# Patient Record
Sex: MALE | Employment: OTHER
[De-identification: names, ages, dates, MRNs, and addresses within clinical notes are randomized per-mention and may not be internally consistent; named-entity substitution may affect disease eponyms.]

---

## 2017-03-01 RX ORDER — TRAMADOL HYDROCHLORIDE 50 MG/1
50 TABLET ORAL EVERY 6 HOURS PRN
Qty: 90 TABLET | Refills: 1 | OUTPATIENT
Start: 2017-03-01 | End: 2017-03-11

## 2017-03-03 ENCOUNTER — TELEPHONE (OUTPATIENT)
Dept: FAMILY MEDICINE CLINIC | Facility: CLINIC | Age: 62
End: 2017-03-03

## 2017-03-03 ENCOUNTER — OFFICE VISIT (OUTPATIENT)
Dept: FAMILY MEDICINE CLINIC | Facility: CLINIC | Age: 62
End: 2017-03-03

## 2017-03-03 VITALS
BODY MASS INDEX: 30.3 KG/M2 | HEIGHT: 71 IN | SYSTOLIC BLOOD PRESSURE: 126 MMHG | HEART RATE: 86 BPM | TEMPERATURE: 98 F | DIASTOLIC BLOOD PRESSURE: 82 MMHG | OXYGEN SATURATION: 98 % | WEIGHT: 216.4 LBS

## 2017-03-03 DIAGNOSIS — Z12.5 SCREENING FOR PROSTATE CANCER: ICD-10-CM

## 2017-03-03 DIAGNOSIS — M79.646 THUMB PAIN, UNSPECIFIED LATERALITY: ICD-10-CM

## 2017-03-03 DIAGNOSIS — F32.A DEPRESSION, UNSPECIFIED DEPRESSION TYPE: ICD-10-CM

## 2017-03-03 DIAGNOSIS — Z12.11 SCREENING FOR COLON CANCER: ICD-10-CM

## 2017-03-03 DIAGNOSIS — Z13.9 SCREENING: ICD-10-CM

## 2017-03-03 DIAGNOSIS — G89.29 OTHER CHRONIC PAIN: ICD-10-CM

## 2017-03-03 DIAGNOSIS — M19.90 ARTHRITIS: Primary | ICD-10-CM

## 2017-03-03 PROCEDURE — G0328 FECAL BLOOD SCRN IMMUNOASSAY: HCPCS | Performed by: PHYSICIAN ASSISTANT

## 2017-03-03 PROCEDURE — 99214 OFFICE O/P EST MOD 30 MIN: CPT | Performed by: PHYSICIAN ASSISTANT

## 2017-03-03 PROCEDURE — 80305 DRUG TEST PRSMV DIR OPT OBS: CPT | Performed by: PHYSICIAN ASSISTANT

## 2017-03-03 RX ORDER — TRAMADOL HYDROCHLORIDE 50 MG/1
50 TABLET ORAL EVERY 8 HOURS PRN
Qty: 90 TABLET | Refills: 0 | Status: SHIPPED | OUTPATIENT
Start: 2017-03-03 | End: 2017-06-01

## 2017-03-03 RX ORDER — TRAMADOL HYDROCHLORIDE 50 MG/1
50 TABLET ORAL DAILY
COMMUNITY
End: 2017-03-03 | Stop reason: SDUPTHER

## 2017-03-03 ASSESSMENT — ENCOUNTER SYMPTOMS
SHORTNESS OF BREATH: 0
SINUS PRESSURE: 0
RHINORRHEA: 0
COUGH: 0
NAUSEA: 0
EYE DISCHARGE: 0
CHEST TIGHTNESS: 0
ABDOMINAL PAIN: 0
VOMITING: 0
DIARRHEA: 0
SORE THROAT: 0
EYE ITCHING: 0

## 2017-04-18 LAB
ALBUMIN SERPL-MCNC: 4 G/DL
ALP BLD-CCNC: 62 U/L
ALT SERPL-CCNC: 11 U/L
AST SERPL-CCNC: 14 U/L
BASOPHILS ABSOLUTE: 0.1 /ΜL
BASOPHILS RELATIVE PERCENT: 0.9 %
BILIRUB SERPL-MCNC: 0.7 MG/DL (ref 0.1–1.4)
BUN BLDV-MCNC: 19 MG/DL
C-REACTIVE PROTEIN: 0.3
CALCIUM SERPL-MCNC: 8.8 MG/DL
CHLORIDE BLD-SCNC: 105 MMOL/L
CHOLESTEROL, TOTAL: 188 MG/DL
CHOLESTEROL/HDL RATIO: 3.8
CO2: 27 MMOL/L
CREAT SERPL-MCNC: 0.92 MG/DL
EOSINOPHILS ABSOLUTE: 0.4 /ΜL
EOSINOPHILS RELATIVE PERCENT: 6.6 %
GFR CALCULATED: 60
GLUCOSE BLD-MCNC: 86 MG/DL
HCT VFR BLD CALC: 44.2 % (ref 41–53)
HDLC SERPL-MCNC: 50 MG/DL (ref 35–70)
HEMOGLOBIN: 15.1 G/DL (ref 13.5–17.5)
LDL CHOLESTEROL CALCULATED: 122 MG/DL (ref 0–160)
LYMPHOCYTES ABSOLUTE: 1.6 /ΜL
LYMPHOCYTES RELATIVE PERCENT: 24.3 %
MCH RBC QN AUTO: 30 PG
MCHC RBC AUTO-ENTMCNC: 34.1 G/DL
MCV RBC AUTO: 88 FL
MONOCYTES ABSOLUTE: 0.5 /ΜL
MONOCYTES RELATIVE PERCENT: 8.2 %
NEUTROPHILS ABSOLUTE: 3.9 /ΜL
NEUTROPHILS RELATIVE PERCENT: 60 %
PDW BLD-RTO: 13.7 %
PLATELET # BLD: 239 K/ΜL
PMV BLD AUTO: 7.6 FL
POTASSIUM SERPL-SCNC: 3.6 MMOL/L
PROSTATE SPECIFIC ANTIGEN: 0.69 NG/ML
RBC # BLD: 5.02 10^6/ΜL
RHEUMATOID FACTOR: 10
SEDIMENTATION RATE, ERYTHROCYTE: 11
SODIUM BLD-SCNC: 140 MMOL/L
TOTAL PROTEIN: 7.1
TRIGL SERPL-MCNC: 80 MG/DL
TSH SERPL DL<=0.05 MIU/L-ACNC: 1.18 UIU/ML
VITAMIN D 25-HYDROXY: 32.4
VITAMIN D2, 25 HYDROXY: NORMAL
VITAMIN D3,25 HYDROXY: NORMAL
VLDLC SERPL CALC-MCNC: 16 MG/DL
WBC # BLD: 6.4 10^3/ML

## 2017-04-19 DIAGNOSIS — Z12.5 SCREENING FOR PROSTATE CANCER: ICD-10-CM

## 2017-04-19 DIAGNOSIS — Z13.9 SCREENING: ICD-10-CM

## 2017-04-19 DIAGNOSIS — G89.29 OTHER CHRONIC PAIN: ICD-10-CM

## 2017-04-19 DIAGNOSIS — M19.90 ARTHRITIS: ICD-10-CM

## 2017-04-19 DIAGNOSIS — F32.A DEPRESSION, UNSPECIFIED DEPRESSION TYPE: ICD-10-CM

## 2017-06-14 ENCOUNTER — OFFICE VISIT (OUTPATIENT)
Dept: FAMILY MEDICINE CLINIC | Age: 62
End: 2017-06-14
Payer: COMMERCIAL

## 2017-06-14 VITALS
OXYGEN SATURATION: 97 % | HEART RATE: 76 BPM | HEIGHT: 71 IN | SYSTOLIC BLOOD PRESSURE: 126 MMHG | WEIGHT: 211.3 LBS | BODY MASS INDEX: 29.58 KG/M2 | DIASTOLIC BLOOD PRESSURE: 82 MMHG | TEMPERATURE: 98 F

## 2017-06-14 DIAGNOSIS — Z12.11 SCREENING FOR COLON CANCER: ICD-10-CM

## 2017-06-14 DIAGNOSIS — M79.641 PAIN IN BOTH HANDS: Primary | ICD-10-CM

## 2017-06-14 DIAGNOSIS — M19.90 OSTEOARTHRITIS, UNSPECIFIED OSTEOARTHRITIS TYPE, UNSPECIFIED SITE: ICD-10-CM

## 2017-06-14 DIAGNOSIS — M79.642 PAIN IN BOTH HANDS: Primary | ICD-10-CM

## 2017-06-14 DIAGNOSIS — F32.A DEPRESSION, UNSPECIFIED DEPRESSION TYPE: ICD-10-CM

## 2017-06-14 PROCEDURE — 99213 OFFICE O/P EST LOW 20 MIN: CPT | Performed by: PHYSICIAN ASSISTANT

## 2017-06-14 PROCEDURE — 73130 X-RAY EXAM OF HAND: CPT | Performed by: PHYSICIAN ASSISTANT

## 2017-06-14 ASSESSMENT — ENCOUNTER SYMPTOMS
ABDOMINAL PAIN: 0
VOMITING: 0
DIARRHEA: 0
SHORTNESS OF BREATH: 0
EYE DISCHARGE: 0
COUGH: 0
RHINORRHEA: 0
EYE ITCHING: 0
CHEST TIGHTNESS: 0
SINUS PRESSURE: 0
NAUSEA: 0
SORE THROAT: 0

## 2017-06-14 ASSESSMENT — PATIENT HEALTH QUESTIONNAIRE - PHQ9
2. FEELING DOWN, DEPRESSED OR HOPELESS: 0
1. LITTLE INTEREST OR PLEASURE IN DOING THINGS: 0
SUM OF ALL RESPONSES TO PHQ9 QUESTIONS 1 & 2: 0
SUM OF ALL RESPONSES TO PHQ QUESTIONS 1-9: 0

## 2017-06-20 ENCOUNTER — TELEPHONE (OUTPATIENT)
Dept: FAMILY MEDICINE CLINIC | Age: 62
End: 2017-06-20

## 2017-06-22 RX ORDER — TRAMADOL HYDROCHLORIDE 50 MG/1
50 TABLET ORAL EVERY 6 HOURS PRN
Qty: 90 TABLET | Refills: 0 | Status: SHIPPED | OUTPATIENT
Start: 2017-06-22 | End: 2017-10-09 | Stop reason: SDUPTHER

## 2017-06-22 RX ORDER — TRAMADOL HYDROCHLORIDE 50 MG/1
50 TABLET ORAL EVERY 6 HOURS PRN
Qty: 90 TABLET | Refills: 0 | OUTPATIENT
Start: 2017-06-22 | End: 2017-07-02

## 2017-07-25 RX ORDER — CELECOXIB 200 MG/1
200 CAPSULE ORAL 2 TIMES DAILY
Qty: 180 CAPSULE | Refills: 0 | Status: SHIPPED | OUTPATIENT
Start: 2017-07-25 | End: 2017-10-09

## 2017-09-14 NOTE — TELEPHONE ENCOUNTER
Pt called and stated he needs a refill for this med and that he only takes 1 pill before work in the morning and that's it. I did advise he may need an appt. Pt stated he will wait to see what Manny Redd has to say. Pt did say he fell on a slick spot at work and hit his bottom and he is having pain and I did LM to advise he needs to follow up with his employer to find out where he can be evaluated for the work injury since we don't do workers comp here.

## 2017-09-18 RX ORDER — TRAMADOL HYDROCHLORIDE 50 MG/1
50 TABLET ORAL EVERY 8 HOURS PRN
Qty: 90 TABLET | Refills: 0 | OUTPATIENT
Start: 2017-09-18 | End: 2017-09-28

## 2017-09-20 ENCOUNTER — TELEPHONE (OUTPATIENT)
Dept: FAMILY MEDICINE CLINIC | Age: 62
End: 2017-09-20

## 2017-10-09 ENCOUNTER — TELEPHONE (OUTPATIENT)
Dept: FAMILY MEDICINE CLINIC | Age: 62
End: 2017-10-09

## 2017-10-09 ENCOUNTER — OFFICE VISIT (OUTPATIENT)
Dept: FAMILY MEDICINE CLINIC | Age: 62
End: 2017-10-09
Payer: COMMERCIAL

## 2017-10-09 VITALS
HEART RATE: 68 BPM | DIASTOLIC BLOOD PRESSURE: 63 MMHG | BODY MASS INDEX: 29.96 KG/M2 | WEIGHT: 214 LBS | SYSTOLIC BLOOD PRESSURE: 101 MMHG | OXYGEN SATURATION: 96 % | HEIGHT: 71 IN | TEMPERATURE: 97.8 F

## 2017-10-09 DIAGNOSIS — M54.42 LEFT-SIDED LOW BACK PAIN WITH LEFT-SIDED SCIATICA, UNSPECIFIED CHRONICITY: Primary | ICD-10-CM

## 2017-10-09 DIAGNOSIS — M19.90 OSTEOARTHRITIS, UNSPECIFIED OSTEOARTHRITIS TYPE, UNSPECIFIED SITE: Primary | ICD-10-CM
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
## 2017-10-09 DIAGNOSIS — G89.29 CHRONIC LOW BACK PAIN WITH LEFT-SIDED SCIATICA, UNSPECIFIED BACK PAIN LATERALITY: ICD-10-CM

## 2017-10-09 DIAGNOSIS — M54.42 CHRONIC LOW BACK PAIN WITH LEFT-SIDED SCIATICA, UNSPECIFIED BACK PAIN LATERALITY: ICD-10-CM

## 2017-10-09 DIAGNOSIS — M54.2 NECK PAIN: ICD-10-CM

## 2017-10-09 DIAGNOSIS — R19.5 POSITIVE FIT (FECAL IMMUNOCHEMICAL TEST): ICD-10-CM

## 2017-10-09 PROCEDURE — 72110 X-RAY EXAM L-2 SPINE 4/>VWS: CPT | Performed by: PHYSICIAN ASSISTANT

## 2017-10-09 PROCEDURE — 99213 OFFICE O/P EST LOW 20 MIN: CPT | Performed by: PHYSICIAN ASSISTANT

## 2017-10-09 RX ORDER — TRAMADOL HYDROCHLORIDE 50 MG/1
50 TABLET ORAL DAILY PRN
Qty: 90 TABLET | Refills: 0 | Status: SHIPPED | OUTPATIENT
Start: 2017-10-09 | End: 2017-12-19

## 2017-10-09 ASSESSMENT — ENCOUNTER SYMPTOMS
BACK PAIN: 1
BOWEL INCONTINENCE: 0
CHEST TIGHTNESS: 0
COUGH: 0
RHINORRHEA: 0
SORE THROAT: 0
VOMITING: 0
SINUS PRESSURE: 0
ABDOMINAL PAIN: 0
SHORTNESS OF BREATH: 0
NAUSEA: 0
EYE ITCHING: 0
EYE DISCHARGE: 0
DIARRHEA: 0

## 2017-10-09 NOTE — PROGRESS NOTES
pain is at a severity of 5/10. The pain is mild. The pain has been fluctuating since the incident. Pertinent negatives include no chest pain or numbness. He has tried nothing for the symptoms. The treatment provided no relief. Fall   The accident occurred more than 1 week ago. The pain is at a severity of 5/10. The pain is moderate. Pertinent negatives include no abdominal pain, bowel incontinence, fever, headaches, hearing loss, hematuria, loss of consciousness, nausea, numbness or vomiting. The treatment provided moderate relief. Back Pain   Pertinent negatives include no abdominal pain, bowel incontinence, chest pain, dysuria, fever, headaches, numbness or weakness. No results found for: LABA1C          ( goal A1C is < 7)   No results found for: LABMICR  LDL Calculated (mg/dL)   Date Value   04/18/2017 122   06/07/2016 156       (goal LDL is <100)   AST (U/L)   Date Value   04/18/2017 14     ALT (U/L)   Date Value   04/18/2017 11     BUN (mg/dL)   Date Value   04/18/2017 19     BP Readings from Last 3 Encounters:   10/09/17 101/63   06/14/17 126/82   03/03/17 126/82          (goal 120/80)    Past Medical History:   Diagnosis Date    Depression     Knee pain     Night sweats     Osteoarthritis     Chronic    Weight loss       History reviewed. No pertinent surgical history. History reviewed. No pertinent family history. Social History   Substance Use Topics    Smoking status: Never Smoker    Smokeless tobacco: Never Used    Alcohol use Not on file      Current Outpatient Prescriptions   Medication Sig Dispense Refill    traMADol (ULTRAM) 50 MG tablet Take 1 tablet by mouth daily as needed for Pain 90 tablet 0    celecoxib (CELEBREX) 200 MG capsule Take 1 capsule by mouth 2 times daily 180 capsule 0    chlorhexidine (PERIDEX) 0.12 % solution        No current facility-administered medications for this visit.       Allergies   Allergen Reactions    Hydrocodone Nausea Only       Health reviewed. Arthritis - Bilat hands, feet, neck, back. Taking Celebrex BID-D/C med for pos FIT test - 10/9/17. Ultram 1 daily. HLD - H/o labs ordered. Balanced diet and routine exercise encouraged. Multivitamin with vitamin D daily encouraged. Good sleep hygiene encouraged. Dental exam q 6 mo. Vision yearly. Sunscreen encouraged. Immunizations reviewed. Humaira Tuttle. Urine drug - 3/3/17    Pain contract - 3/3/17    Refusing Flu, shingles and Tdap     Pneumonia at 65    Tdap - 6/14/17    PSA - 4/2017 NL    Colonoscopy - Pt refusing. FIT kit pos 10/9/17 - Referral for colonoscopy / endoscopy. Stop all NSAIDs     Patient given educational materials - see patient instructions. Discussed use, benefit, and side effects of prescribed medications. All patient questions answered. Pt voiced understanding. Reviewed health maintenance. Instructed to continue current medications, diet and exercise. Patient agreed with treatment plan. Follow up as directed.      Electronically signed by Godwin Arteaga PA-C on 10/9/2017 at 10:04 AM
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
## 2017-10-11 DIAGNOSIS — M54.2 NECK PAIN: ICD-10-CM

## 2017-10-11 DIAGNOSIS — R93.89 ABNORMAL X-RAY: Primary | ICD-10-CM

## 2017-10-11 DIAGNOSIS — G89.29 CHRONIC LOW BACK PAIN WITH LEFT-SIDED SCIATICA, UNSPECIFIED BACK PAIN LATERALITY: ICD-10-CM

## 2017-10-11 DIAGNOSIS — M54.42 CHRONIC LOW BACK PAIN WITH LEFT-SIDED SCIATICA, UNSPECIFIED BACK PAIN LATERALITY: ICD-10-CM

## 2017-10-31 ENCOUNTER — HOSPITAL ENCOUNTER (OUTPATIENT)
Dept: MRI IMAGING | Age: 62
Discharge: HOME | End: 2017-10-31
Payer: COMMERCIAL
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
## 2017-10-31 DIAGNOSIS — R93.89 ABNORMAL X-RAY: ICD-10-CM

## 2017-10-31 DIAGNOSIS — M54.2 NECK PAIN: ICD-10-CM

## 2017-10-31 PROCEDURE — 72148 MRI LUMBAR SPINE W/O DYE: CPT

## 2017-10-31 PROCEDURE — 72141 MRI NECK SPINE W/O DYE: CPT

## 2017-11-13 ENCOUNTER — TELEPHONE (OUTPATIENT)
Dept: FAMILY MEDICINE CLINIC | Age: 62
End: 2017-11-13

## 2018-01-09 ENCOUNTER — OFFICE VISIT (OUTPATIENT)
Dept: FAMILY MEDICINE CLINIC | Age: 63
End: 2018-01-09
Payer: COMMERCIAL

## 2018-01-09 VITALS
DIASTOLIC BLOOD PRESSURE: 69 MMHG | SYSTOLIC BLOOD PRESSURE: 123 MMHG | TEMPERATURE: 98.1 F | WEIGHT: 217 LBS | OXYGEN SATURATION: 97 % | HEIGHT: 71 IN | BODY MASS INDEX: 30.38 KG/M2 | HEART RATE: 76 BPM

## 2018-01-09 DIAGNOSIS — Z00.00 ANNUAL PHYSICAL EXAM: Primary | ICD-10-CM

## 2018-01-09 DIAGNOSIS — G47.00 INSOMNIA, UNSPECIFIED TYPE: ICD-10-CM

## 2018-01-09 DIAGNOSIS — Z11.4 ENCOUNTER FOR SCREENING FOR HIV: ICD-10-CM

## 2018-01-09 DIAGNOSIS — Z12.5 SCREENING PSA (PROSTATE SPECIFIC ANTIGEN): ICD-10-CM

## 2018-01-09 DIAGNOSIS — F32.A DEPRESSION, UNSPECIFIED DEPRESSION TYPE: ICD-10-CM

## 2018-01-09 DIAGNOSIS — M25.552 PAIN OF LEFT HIP JOINT: ICD-10-CM

## 2018-01-09 DIAGNOSIS — M19.90 OSTEOARTHRITIS, UNSPECIFIED OSTEOARTHRITIS TYPE, UNSPECIFIED SITE: ICD-10-CM

## 2018-01-09 DIAGNOSIS — Z11.59 NEED FOR HEPATITIS C SCREENING TEST: ICD-10-CM

## 2018-01-09 DIAGNOSIS — R41.3 MEMORY CHANGE: ICD-10-CM

## 2018-01-09 PROCEDURE — 99396 PREV VISIT EST AGE 40-64: CPT | Performed by: PHYSICIAN ASSISTANT

## 2018-01-09 RX ORDER — TRAZODONE HYDROCHLORIDE 50 MG/1
50 TABLET ORAL NIGHTLY
Qty: 30 TABLET | Refills: 0 | Status: SHIPPED | OUTPATIENT
Start: 2018-01-09 | End: 2019-11-08

## 2018-01-09 RX ORDER — CELECOXIB 200 MG/1
200 CAPSULE ORAL 2 TIMES DAILY
COMMUNITY
End: 2018-01-09

## 2018-01-09 RX ORDER — TRAMADOL HYDROCHLORIDE 50 MG/1
50 TABLET ORAL EVERY 6 HOURS PRN
COMMUNITY
End: 2018-01-30 | Stop reason: SDUPTHER

## 2018-01-09 ASSESSMENT — ENCOUNTER SYMPTOMS
NAUSEA: 0
CHEST TIGHTNESS: 0
RHINORRHEA: 0
EYE ITCHING: 0
DIARRHEA: 0
SHORTNESS OF BREATH: 0
EYE DISCHARGE: 0
SORE THROAT: 0
VOMITING: 0
SINUS PRESSURE: 0
COUGH: 0
BACK PAIN: 1
ABDOMINAL PAIN: 0

## 2018-01-09 NOTE — PROGRESS NOTES
[address]  [address] [place] [id number]  Dept: [phone]  Dept Fax: [phone]    Davey Pruitt is a 58 y.o. male who presents today for his medical conditions/complaints as noted below. Davey Pruitt is c/o of   Chief Complaint   Patient presents with    Depression    Arthritis     Visit Information    Have you changed or started any medications since your last visit including any over-the-counter medicines, vitamins, or herbal medicines? no   Have you stopped taking any of your medications? Is so, why? -  no  Are you having any side effects from any of your medications? - no    Have you seen any other physician or provider since your last visit?  no   Have you had any other diagnostic tests since your last visit?  no   Have you been seen in the emergency room and/or had an admission in a hospital since we last saw you?  no   Have you had your routine dental cleaning in the past 6 months?  yes -      Do you have an active [employer] account? If no, what is the barrier? No:     Patient Care Team:  Julieta Hendrickson PA-C as PCP - General (Physician Assistant)    Medical History Review  Past Medical, Family, and Social History reviewed and does contribute to the patient presenting condition    Health Maintenance   Topic Date Due    Hepatitis C screen  1955    HIV screen  03/24/1970    Flu vaccine (1) 10/09/2018 (Originally 9/1/2017)    Lipid screen  04/18/2022    Colon cancer screen colonoscopy  05/17/2026    DTaP/Tdap/Td vaccine (2 - Td) 06/14/2027    Zostavax vaccine  Addressed               HPI:     Hand Pain    The incident occurred more than 1 week ago. There was no injury mechanism. The pain is present in the left hand and right hand. The quality of the pain is described as aching. The pain does not radiate. The pain is at a severity of 5/10. The pain is mild. The pain has been fluctuating since the incident.  Pertinent negatives include no chest pain, numbness or tingling. He has tried nothing for the symptoms. The treatment provided no relief. Back Pain   This is a chronic problem. The pain is present in the lumbar spine (Neck). The pain is at a severity of 6/10. Pertinent negatives include no abdominal pain, chest pain, dysuria, fever, headaches, leg pain, numbness, tingling or weakness. Mental Health Problem     Additional symptoms of the illness include anhedonia and insomnia. Additional symptoms of the illness do not include hypersomnia, appetite change, unexpected weight change, fatigue, agitation, psychomotor retardation, headaches or abdominal pain. He does not admit to suicidal ideas. He does not have a plan to commit suicide. He does not contemplate harming himself. He has not already injured self. He does not contemplate injuring another person. He has not already  injured another person. No results found for: LABA1C          ( goal A1C is < 7)   No results found for: LABMICR  LDL Calculated (mg/dL)   Date Value   04/18/2017 122   06/07/2016 156       (goal LDL is <100)   AST (U/L)   Date Value   04/18/2017 14     ALT (U/L)   Date Value   04/18/2017 11     BUN (mg/dL)   Date Value   04/18/2017 19     BP Readings from Last 3 Encounters:   01/09/18 123/69   10/09/17 101/63   06/14/17 126/82          (goal 120/80)    Past Medical History:   Diagnosis Date    Depression     Knee pain     Night sweats     Osteoarthritis     Chronic    Weight loss       History reviewed. No pertinent surgical history. History reviewed. No pertinent family history. Social History   Substance Use Topics    Smoking status: Never Smoker    Smokeless tobacco: Never Used    Alcohol use Not on file      Current Outpatient Prescriptions   Medication Sig Dispense Refill    traMADol (ULTRAM) 50 MG tablet Take 50 mg by mouth every 6 hours as needed for Pain.       traZODone (DESYREL) 50 MG tablet Take 1 tablet by mouth nightly routine exercise encouraged. Multivitamin with vitamin D daily encouraged. Good sleep hygiene encouraged. Dental exam q 6 mo. Vision yearly. Sunscreen encouraged. Immunizations reviewed. Janne Sprain Dr. Nadara Dancer. Urine drug - 3/3/17    Pain contract - 3/3/17    Refusing Flu, shingles and Tdap     Pneumonia at 65    Tdap - 6/14/17    PSA - 4/2017 NL    Colonoscopy - Pt refusing. FIT kit pos 10/9/17 - Referral for colonoscopy / endoscopy. Stop all NSAIDs     Patient given educational materials - see patient instructions. Discussed use, benefit, and side effects of prescribed medications. All patient questions answered. Pt voiced understanding. Reviewed health maintenance. Instructed to continue current medications, diet and exercise. Patient agreed with treatment plan. Follow up as directed.      Electronically signed by Bobby Fisher PA-C on 1/9/2018 at 10:24 AM

## 2018-01-24 ENCOUNTER — HOSPITAL ENCOUNTER (OUTPATIENT)
Dept: MRI IMAGING | Age: 63
Discharge: HOME | End: 2018-01-24
Payer: COMMERCIAL

## 2018-01-24 DIAGNOSIS — R41.3 MEMORY CHANGE: ICD-10-CM

## 2018-01-24 LAB
CREAT SERPL-MCNC: 0.99 MG/DL (ref 0.7–1.2)
GFR AFRICAN AMERICAN: >60 ML/MIN
GFR NON-AFRICAN AMERICAN: >60 ML/MIN
GFR SERPL CREATININE-BSD FRML MDRD: NORMAL ML/MIN/{1.73_M2}
GFR SERPL CREATININE-BSD FRML MDRD: NORMAL ML/MIN/{1.73_M2}

## 2018-01-24 PROCEDURE — 82565 ASSAY OF CREATININE: CPT

## 2018-01-24 PROCEDURE — A9579 GAD-BASE MR CONTRAST NOS,1ML: HCPCS | Performed by: PHYSICIAN ASSISTANT

## 2018-01-24 PROCEDURE — 70553 MRI BRAIN STEM W/O & W/DYE: CPT

## 2018-01-24 PROCEDURE — 6360000004 HC RX CONTRAST MEDICATION: Performed by: PHYSICIAN ASSISTANT

## 2018-01-24 PROCEDURE — 36415 COLL VENOUS BLD VENIPUNCTURE: CPT

## 2018-01-24 RX ADMIN — GADOTERIDOL 20 ML: 279.3 INJECTION, SOLUTION INTRAVENOUS at 09:14

## 2018-01-30 DIAGNOSIS — G89.29 OTHER CHRONIC PAIN: Primary | ICD-10-CM

## 2018-01-30 RX ORDER — TRAMADOL HYDROCHLORIDE 50 MG/1
50 TABLET ORAL DAILY PRN
Qty: 90 TABLET | Refills: 0 | Status: SHIPPED | OUTPATIENT
Start: 2018-01-30 | End: 2018-04-30

## 2018-01-30 RX ORDER — AMOXICILLIN AND CLAVULANATE POTASSIUM 875; 125 MG/1; MG/1
1 TABLET, FILM COATED ORAL 2 TIMES DAILY
Qty: 20 TABLET | Refills: 0 | Status: SHIPPED | OUTPATIENT
Start: 2018-01-30 | End: 2018-02-09

## 2018-04-11 PROBLEM — Z12.5 SCREENING PSA (PROSTATE SPECIFIC ANTIGEN): Status: RESOLVED | Noted: 2018-01-09 | Resolved: 2018-04-11

## 2018-04-12 DIAGNOSIS — M25.552 PAIN OF LEFT HIP JOINT: ICD-10-CM

## 2018-12-31 DIAGNOSIS — F32.A DEPRESSION, UNSPECIFIED DEPRESSION TYPE: Primary | ICD-10-CM

## 2019-01-05 RX ORDER — CELECOXIB 200 MG/1
200 CAPSULE ORAL 2 TIMES DAILY
Qty: 120 CAPSULE | Refills: 1 | Status: SHIPPED | OUTPATIENT
Start: 2019-01-05 | End: 2020-01-02 | Stop reason: SDUPTHER

## 2019-06-17 ENCOUNTER — OFFICE VISIT (OUTPATIENT)
Dept: PODIATRY | Age: 64
End: 2019-06-17
Payer: COMMERCIAL

## 2019-06-17 VITALS — BODY MASS INDEX: 32.07 KG/M2 | HEIGHT: 70 IN | RESPIRATION RATE: 16 BRPM | WEIGHT: 224 LBS

## 2019-06-17 DIAGNOSIS — M79.605 PAIN OF LEFT LOWER EXTREMITY: ICD-10-CM

## 2019-06-17 DIAGNOSIS — M21.612 BUNION, LEFT FOOT: Primary | ICD-10-CM

## 2019-06-17 PROCEDURE — 99203 OFFICE O/P NEW LOW 30 MIN: CPT | Performed by: PODIATRIST

## 2019-06-17 NOTE — PROGRESS NOTES
[facility]  [facility] [address]  [address] [place] [address]  Dept: [phone]  Dept Fax: [phone]    NEW PATIENT PROGRESS NOTE  Date of patient's visit: 6/17/2019  Patient's Name:  Jaky Kent YOB: 1955            Patient Care Team:  Tena Cisneros PA-C as PCP - General (Physician Assistant)  Tena Cisneros PA-C as PCP - [facility] Empaneled Provider  Shankar Trevino DPM as Physician (Podiatry)        Chief Complaint   Patient presents with    New Patient    Foot Pain     left foot         HPI:   Jaky Kent is a 59 y.o. male who presents to the office today complaining of pain to left foot. Symptoms began  year(s) ago and have just increasingly became painful and changing walking. Patient relates pain is Present. Pain is rated 2 out of 10 and is described as intermittent. Treatments prior to today's visit include: no previous treatment for this problem. Currently denies F/C/N/V. Pt's primary care physician is Dale Nickerson PA-C last seen 1/9/18    Allergies   Allergen Reactions    Hydrocodone Nausea Only       Past Medical History:   Diagnosis Date    Depression     Knee pain     Night sweats     Osteoarthritis     Chronic    Weight loss        Prior to Admission medications    Medication Sig Start Date End Date Taking? Authorizing Provider   celecoxib (CELEBREX) 200 MG capsule Take 1 capsule by mouth 2 times daily 1/5/19   Te Garcia MD   traZODone (DESYREL) 50 MG tablet Take 1 tablet by mouth nightly 1/9/18   Tena Cisneros PA-C       No past surgical history on file. No family history on file.     Social History     Tobacco Use    Smoking status: Never Smoker    Smokeless tobacco: Never Used   Substance Use Topics    Alcohol use: Not on file       Review of Systems    Review of Systems:   History obtained from chart review and the patient  General ROS: negative for - chills, fatigue, fever, night sweats or weight gain  Constitutional: Negative for chills, diaphoresis, fatigue, fever and unexpected weight change. Musculoskeletal: Positive for arthralgias, gait problem and joint swelling. Neurological ROS: negative for - behavioral changes, confusion, headaches or seizures. Negative for weakness and numbness. Dermatological ROS: negative for - mole changes, rash  Cardiovascular: Negative for leg swelling. Gastrointestinal: Negative for constipation, diarrhea, nausea and vomiting. Lower Extremity Physical Examination:   Vitals:   Vitals:    06/17/19 0842   Resp: 16     General: AAO x 3 in NAD. Dermatologic Exam:  Skin lesion/ulceration Absent . Skin No rashes or nodules noted. .       Musculoskeletal:     1st MPJ ROM decreased, Bilateral.  Muscle strength 5/5, Bilateral.  Pain present upon palpation of left medial 1st MPJ. There is increased hallux lateral deviation and increased medial jasen prominence 1st MPJ. Medial longitudinal arch, Bilateral WNL. Ankle ROM WNL,Bilateral.    Dorsally contracted digits absent digits 1-5 Bilateral.     Vascular: DP and PT pulses palpable 2/4, Bilateral.  CFT <3 seconds, Bilateral.  Hair growth present to the level of the digits, Bilateral.  Edema absent, Bilateral.  Varicosities absent, Bilateral. Erythema absent, Bilateral    Neurological: Sensation intact to light touch to level of digits, Bilateral.  Protective sensation intact 10/10 sites via 5.07/10g [place] Monofilament, Bilateral.  negative Tinel's, Bilateral.  negative Valleix sign, Bilateral.      Integument: Warm, dry, supple, Bilateral.  Open lesion absent, Bilateral.  Interdigital maceration absent to web spaces 1-4, Bilateral.  Nails are normal in length, thickness and color 1-5 bilateral.  Fissures absent, Bilateral.     Radiographs:  3 views left foot: There is increased hallux lateral deviation and increased medial jasen prominence 1st MPJ.  Wide 1st and 2nd metatarsal IM elva.    Asessment: Patient is a 59 y.o. male with:    Diagnosis Orders   1. Bunion, left foot  XR FOOT LEFT (MIN 3 VIEWS)   2. Pain of left lower extremity  XR FOOT LEFT (MIN 3 VIEWS)         Plan: Patient examined and evaluated. Current condition and treatment options discussed in detail. Discussed conservative and surgical options with the patient. Reviewed and discussed xrays with patient with above findings. Advised pt to only consider surgery as last treatment option. He would like to discuss with family. Verbal and written instructions given to patient. Contact office with any questions/problems/concerns. RTC in 2month(s).     6/17/2019    Electronically signed by Kayla Mosqueda DPM on 6/17/2019 at 8:58 AM  6/17/2019

## 2019-11-08 ENCOUNTER — OFFICE VISIT (OUTPATIENT)
Dept: FAMILY MEDICINE CLINIC | Age: 64
End: 2019-11-08
Payer: COMMERCIAL

## 2019-11-08 ENCOUNTER — HOSPITAL ENCOUNTER (OUTPATIENT)
Age: 64
Setting detail: SPECIMEN
Discharge: HOME | End: 2019-11-08
Payer: COMMERCIAL

## 2019-11-08 VITALS
DIASTOLIC BLOOD PRESSURE: 68 MMHG | HEART RATE: 67 BPM | OXYGEN SATURATION: 97 % | SYSTOLIC BLOOD PRESSURE: 132 MMHG | WEIGHT: 224.4 LBS | HEIGHT: 71 IN | TEMPERATURE: 97.3 F | BODY MASS INDEX: 31.42 KG/M2

## 2019-11-08 DIAGNOSIS — Z12.5 SCREENING FOR PROSTATE CANCER: ICD-10-CM

## 2019-11-08 DIAGNOSIS — R05.9 COUGH: ICD-10-CM

## 2019-11-08 DIAGNOSIS — Z12.11 SCREENING FOR COLON CANCER: ICD-10-CM

## 2019-11-08 DIAGNOSIS — R06.83 SNORING: ICD-10-CM

## 2019-11-08 DIAGNOSIS — Z13.220 SCREENING FOR LIPID DISORDERS: ICD-10-CM

## 2019-11-08 DIAGNOSIS — R06.81 APNEA: ICD-10-CM

## 2019-11-08 DIAGNOSIS — M25.561 CHRONIC PAIN OF RIGHT KNEE: ICD-10-CM

## 2019-11-08 DIAGNOSIS — M54.2 CHRONIC NECK PAIN: ICD-10-CM

## 2019-11-08 DIAGNOSIS — G89.29 CHRONIC NECK PAIN: ICD-10-CM

## 2019-11-08 DIAGNOSIS — R39.11 URINARY HESITANCY: ICD-10-CM

## 2019-11-08 DIAGNOSIS — M54.50 CHRONIC LOW BACK PAIN, UNSPECIFIED BACK PAIN LATERALITY, UNSPECIFIED WHETHER SCIATICA PRESENT: ICD-10-CM

## 2019-11-08 DIAGNOSIS — R39.12 WEAK URINARY STREAM: ICD-10-CM

## 2019-11-08 DIAGNOSIS — N20.0 RENAL STONE: ICD-10-CM

## 2019-11-08 DIAGNOSIS — Z00.00 ANNUAL PHYSICAL EXAM: Primary | ICD-10-CM

## 2019-11-08 DIAGNOSIS — Z11.4 ENCOUNTER FOR SCREENING FOR HIV: ICD-10-CM

## 2019-11-08 DIAGNOSIS — Z13.29 SCREENING FOR THYROID DISORDER: ICD-10-CM

## 2019-11-08 DIAGNOSIS — G89.29 CHRONIC PAIN OF RIGHT KNEE: ICD-10-CM

## 2019-11-08 DIAGNOSIS — Z23 NEED FOR PROPHYLACTIC VACCINATION AND INOCULATION AGAINST VARICELLA: ICD-10-CM

## 2019-11-08 DIAGNOSIS — F32.A DEPRESSION, UNSPECIFIED DEPRESSION TYPE: ICD-10-CM

## 2019-11-08 DIAGNOSIS — R40.0 DAYTIME SLEEPINESS: ICD-10-CM

## 2019-11-08 DIAGNOSIS — M19.90 OSTEOARTHRITIS, UNSPECIFIED OSTEOARTHRITIS TYPE, UNSPECIFIED SITE: ICD-10-CM

## 2019-11-08 DIAGNOSIS — R19.5 POSITIVE FIT (FECAL IMMUNOCHEMICAL TEST): ICD-10-CM

## 2019-11-08 DIAGNOSIS — Z11.59 NEED FOR HEPATITIS C SCREENING TEST: ICD-10-CM

## 2019-11-08 DIAGNOSIS — Z13.1 SCREENING FOR DIABETES MELLITUS: ICD-10-CM

## 2019-11-08 DIAGNOSIS — G89.29 CHRONIC LOW BACK PAIN, UNSPECIFIED BACK PAIN LATERALITY, UNSPECIFIED WHETHER SCIATICA PRESENT: ICD-10-CM

## 2019-11-08 LAB
BILIRUBIN, POC: ABNORMAL
BLOOD URINE, POC: ABNORMAL
CLARITY, POC: CLEAR
COLOR, POC: YELLOW
GLUCOSE URINE, POC: ABNORMAL
KETONES, POC: ABNORMAL
LEUKOCYTE EST, POC: ABNORMAL
NITRITE, POC: ABNORMAL
PH, POC: 5.5
PROTEIN, POC: ABNORMAL
SPECIFIC GRAVITY, POC: 1.03
UROBILINOGEN, POC: 0.2

## 2019-11-08 PROCEDURE — 99396 PREV VISIT EST AGE 40-64: CPT | Performed by: PHYSICIAN ASSISTANT

## 2019-11-08 PROCEDURE — 99214 OFFICE O/P EST MOD 30 MIN: CPT | Performed by: PHYSICIAN ASSISTANT

## 2019-11-08 PROCEDURE — 81003 URINALYSIS AUTO W/O SCOPE: CPT | Performed by: PHYSICIAN ASSISTANT

## 2019-11-08 RX ORDER — IBUPROFEN 200 MG
200 TABLET ORAL EVERY 6 HOURS PRN
COMMUNITY
End: 2021-02-10 | Stop reason: ALTCHOICE

## 2019-11-08 RX ORDER — CELECOXIB 200 MG/1
200 CAPSULE ORAL 2 TIMES DAILY
Qty: 120 CAPSULE | Refills: 1 | Status: CANCELLED | OUTPATIENT
Start: 2019-11-08

## 2019-11-08 ASSESSMENT — ENCOUNTER SYMPTOMS
NAUSEA: 0
RHINORRHEA: 0
EYE DISCHARGE: 0
COUGH: 0
VOMITING: 0
SHORTNESS OF BREATH: 0
SINUS PRESSURE: 0
DIARRHEA: 0
BACK PAIN: 1
CHEST TIGHTNESS: 0
SORE THROAT: 0
EYE ITCHING: 0

## 2019-11-10 LAB
CULTURE: NO GROWTH
Lab: NORMAL
SPECIMEN DESCRIPTION: NORMAL

## 2019-11-12 ENCOUNTER — TELEPHONE (OUTPATIENT)
Dept: GASTROENTEROLOGY | Age: 64
End: 2019-11-12

## 2019-11-19 ENCOUNTER — HOSPITAL ENCOUNTER (OUTPATIENT)
Facility: CLINIC | Age: 64
Discharge: HOME | End: 2019-11-21
Payer: COMMERCIAL

## 2019-11-19 ENCOUNTER — HOSPITAL ENCOUNTER (OUTPATIENT)
Facility: CLINIC | Age: 64
Discharge: HOME | End: 2019-11-19
Payer: COMMERCIAL

## 2019-11-19 ENCOUNTER — HOSPITAL ENCOUNTER (OUTPATIENT)
Dept: GENERAL RADIOLOGY | Facility: CLINIC | Age: 64
Discharge: HOME | End: 2019-11-21
Payer: COMMERCIAL

## 2019-11-19 DIAGNOSIS — M25.561 CHRONIC PAIN OF RIGHT KNEE: ICD-10-CM

## 2019-11-19 DIAGNOSIS — Z11.4 ENCOUNTER FOR SCREENING FOR HIV: ICD-10-CM

## 2019-11-19 DIAGNOSIS — G89.29 CHRONIC NECK PAIN: ICD-10-CM

## 2019-11-19 DIAGNOSIS — Z00.00 ANNUAL PHYSICAL EXAM: ICD-10-CM

## 2019-11-19 DIAGNOSIS — G89.29 CHRONIC LOW BACK PAIN, UNSPECIFIED BACK PAIN LATERALITY, UNSPECIFIED WHETHER SCIATICA PRESENT: ICD-10-CM

## 2019-11-19 DIAGNOSIS — M54.50 CHRONIC LOW BACK PAIN, UNSPECIFIED BACK PAIN LATERALITY, UNSPECIFIED WHETHER SCIATICA PRESENT: ICD-10-CM

## 2019-11-19 DIAGNOSIS — M54.2 CHRONIC NECK PAIN: ICD-10-CM

## 2019-11-19 DIAGNOSIS — Z13.220 SCREENING FOR LIPID DISORDERS: ICD-10-CM

## 2019-11-19 DIAGNOSIS — G89.29 CHRONIC PAIN OF RIGHT KNEE: ICD-10-CM

## 2019-11-19 DIAGNOSIS — F32.A DEPRESSION, UNSPECIFIED DEPRESSION TYPE: ICD-10-CM

## 2019-11-19 DIAGNOSIS — Z13.1 SCREENING FOR DIABETES MELLITUS: ICD-10-CM

## 2019-11-19 DIAGNOSIS — R39.11 URINARY HESITANCY: ICD-10-CM

## 2019-11-19 DIAGNOSIS — R05.9 COUGH: ICD-10-CM

## 2019-11-19 DIAGNOSIS — Z11.59 NEED FOR HEPATITIS C SCREENING TEST: ICD-10-CM

## 2019-11-19 DIAGNOSIS — Z13.29 SCREENING FOR THYROID DISORDER: ICD-10-CM

## 2019-11-19 DIAGNOSIS — Z12.5 SCREENING FOR PROSTATE CANCER: ICD-10-CM

## 2019-11-19 LAB
ABSOLUTE EOS #: 0.29 K/UL (ref 0–0.44)
ABSOLUTE IMMATURE GRANULOCYTE: <0.03 K/UL (ref 0–0.3)
ABSOLUTE LYMPH #: 1.69 K/UL (ref 1.1–3.7)
ABSOLUTE MONO #: 0.6 K/UL (ref 0.1–1.2)
ALBUMIN SERPL-MCNC: 3.9 G/DL (ref 3.5–5.2)
ALBUMIN/GLOBULIN RATIO: 1.1 (ref 1–2.5)
ALP BLD-CCNC: 75 U/L (ref 40–129)
ALT SERPL-CCNC: 11 U/L (ref 5–41)
ANION GAP SERPL CALCULATED.3IONS-SCNC: 12 MMOL/L (ref 9–17)
AST SERPL-CCNC: 11 U/L
BASOPHILS # BLD: 1 % (ref 0–2)
BASOPHILS ABSOLUTE: 0.06 K/UL (ref 0–0.2)
BILIRUB SERPL-MCNC: 0.47 MG/DL (ref 0.3–1.2)
BUN BLDV-MCNC: 17 MG/DL (ref 8–23)
BUN/CREAT BLD: ABNORMAL (ref 9–20)
CALCIUM SERPL-MCNC: 9 MG/DL (ref 8.6–10.4)
CHLORIDE BLD-SCNC: 107 MMOL/L (ref 98–107)
CHOLESTEROL/HDL RATIO: 3.1
CHOLESTEROL: 178 MG/DL
CO2: 24 MMOL/L (ref 20–31)
CREAT SERPL-MCNC: 0.9 MG/DL (ref 0.7–1.2)
DIFFERENTIAL TYPE: ABNORMAL
EOSINOPHILS RELATIVE PERCENT: 4 % (ref 1–4)
GFR AFRICAN AMERICAN: >60 ML/MIN
GFR NON-AFRICAN AMERICAN: >60 ML/MIN
GFR SERPL CREATININE-BSD FRML MDRD: ABNORMAL ML/MIN/{1.73_M2}
GFR SERPL CREATININE-BSD FRML MDRD: ABNORMAL ML/MIN/{1.73_M2}
GLUCOSE BLD-MCNC: 103 MG/DL (ref 70–99)
HCT VFR BLD CALC: 45.7 % (ref 40.7–50.3)
HDLC SERPL-MCNC: 57 MG/DL
HEMOGLOBIN: 14.8 G/DL (ref 13–17)
HEPATITIS C ANTIBODY: NONREACTIVE
HIV AG/AB: NONREACTIVE
IMMATURE GRANULOCYTES: 0 %
LDL CHOLESTEROL: 109 MG/DL (ref 0–130)
LYMPHOCYTES # BLD: 21 % (ref 24–43)
MCH RBC QN AUTO: 29.3 PG (ref 25.2–33.5)
MCHC RBC AUTO-ENTMCNC: 32.4 G/DL (ref 28.4–34.8)
MCV RBC AUTO: 90.5 FL (ref 82.6–102.9)
MONOCYTES # BLD: 8 % (ref 3–12)
NRBC AUTOMATED: 0 PER 100 WBC
PDW BLD-RTO: 13 % (ref 11.8–14.4)
PLATELET # BLD: 270 K/UL (ref 138–453)
PLATELET ESTIMATE: ABNORMAL
PMV BLD AUTO: 9.8 FL (ref 8.1–13.5)
POTASSIUM SERPL-SCNC: 4.2 MMOL/L (ref 3.7–5.3)
PROSTATE SPECIFIC ANTIGEN: 0.76 UG/L
RBC # BLD: 5.05 M/UL (ref 4.21–5.77)
RBC # BLD: ABNORMAL 10*6/UL
SEG NEUTROPHILS: 66 % (ref 36–65)
SEGMENTED NEUTROPHILS ABSOLUTE COUNT: 5.39 K/UL (ref 1.5–8.1)
SODIUM BLD-SCNC: 143 MMOL/L (ref 135–144)
TOTAL PROTEIN: 7.4 G/DL (ref 6.4–8.3)
TRIGL SERPL-MCNC: 60 MG/DL
TSH SERPL DL<=0.05 MIU/L-ACNC: 1.01 MIU/L (ref 0.3–5)
VLDLC SERPL CALC-MCNC: NORMAL MG/DL (ref 1–30)
WBC # BLD: 8.1 K/UL (ref 3.5–11.3)
WBC # BLD: ABNORMAL 10*3/UL

## 2019-11-19 PROCEDURE — 73562 X-RAY EXAM OF KNEE 3: CPT

## 2019-11-19 PROCEDURE — 80053 COMPREHEN METABOLIC PANEL: CPT

## 2019-11-19 PROCEDURE — 72100 X-RAY EXAM L-S SPINE 2/3 VWS: CPT

## 2019-11-19 PROCEDURE — G0103 PSA SCREENING: HCPCS

## 2019-11-19 PROCEDURE — 87389 HIV-1 AG W/HIV-1&-2 AB AG IA: CPT

## 2019-11-19 PROCEDURE — 84443 ASSAY THYROID STIM HORMONE: CPT

## 2019-11-19 PROCEDURE — 80061 LIPID PANEL: CPT

## 2019-11-19 PROCEDURE — 72040 X-RAY EXAM NECK SPINE 2-3 VW: CPT

## 2019-11-19 PROCEDURE — 85025 COMPLETE CBC W/AUTO DIFF WBC: CPT

## 2019-11-19 PROCEDURE — 36415 COLL VENOUS BLD VENIPUNCTURE: CPT

## 2019-11-19 PROCEDURE — 86803 HEPATITIS C AB TEST: CPT

## 2019-11-19 PROCEDURE — 71046 X-RAY EXAM CHEST 2 VIEWS: CPT

## 2019-11-20 ENCOUNTER — HOSPITAL ENCOUNTER (OUTPATIENT)
Dept: CT IMAGING | Facility: CLINIC | Age: 64
Discharge: HOME | End: 2019-11-22
Payer: COMMERCIAL

## 2019-11-20 DIAGNOSIS — N20.0 RENAL STONE: ICD-10-CM

## 2019-11-20 PROCEDURE — 74176 CT ABD & PELVIS W/O CONTRAST: CPT

## 2019-11-21 DIAGNOSIS — Z12.11 SCREENING FOR COLON CANCER: ICD-10-CM

## 2019-11-21 DIAGNOSIS — R19.5 POSITIVE FIT (FECAL IMMUNOCHEMICAL TEST): Primary | ICD-10-CM

## 2019-11-21 RX ORDER — SODIUM, POTASSIUM,MAG SULFATES 17.5-3.13G
SOLUTION, RECONSTITUTED, ORAL ORAL
Qty: 1 BOTTLE | Refills: 0 | Status: ON HOLD | OUTPATIENT
Start: 2019-11-21 | End: 2020-01-06 | Stop reason: ALTCHOICE

## 2019-12-04 ENCOUNTER — TELEPHONE (OUTPATIENT)
Dept: FAMILY MEDICINE CLINIC | Age: 64
End: 2019-12-04

## 2019-12-04 DIAGNOSIS — M25.561 CHRONIC PAIN OF RIGHT KNEE: Primary | ICD-10-CM

## 2019-12-04 DIAGNOSIS — G89.29 CHRONIC PAIN OF RIGHT KNEE: Primary | ICD-10-CM

## 2019-12-05 ENCOUNTER — OFFICE VISIT (OUTPATIENT)
Dept: ORTHOPEDIC SURGERY | Age: 64
End: 2019-12-05
Payer: COMMERCIAL

## 2019-12-05 VITALS — WEIGHT: 224.43 LBS | HEIGHT: 71 IN | BODY MASS INDEX: 31.42 KG/M2

## 2019-12-05 DIAGNOSIS — M25.561 CHRONIC PAIN OF RIGHT KNEE: Primary | ICD-10-CM

## 2019-12-05 DIAGNOSIS — G89.29 CHRONIC PAIN OF RIGHT KNEE: Primary | ICD-10-CM

## 2019-12-05 PROCEDURE — 99203 OFFICE O/P NEW LOW 30 MIN: CPT | Performed by: ORTHOPAEDIC SURGERY

## 2019-12-18 ENCOUNTER — TELEPHONE (OUTPATIENT)
Dept: FAMILY MEDICINE CLINIC | Age: 64
End: 2019-12-18

## 2020-01-02 ENCOUNTER — TELEPHONE (OUTPATIENT)
Dept: GASTROENTEROLOGY | Age: 65
End: 2020-01-02

## 2020-01-02 RX ORDER — CELECOXIB 100 MG/1
100 CAPSULE ORAL 2 TIMES DAILY
Qty: 180 CAPSULE | Refills: 0 | Status: SHIPPED | OUTPATIENT
Start: 2020-01-02 | End: 2020-01-08 | Stop reason: SDUPTHER

## 2020-01-02 NOTE — TELEPHONE ENCOUNTER
Writer RAMÓN for patient to call to confirm colon scheduled 1/6/20 at [facility], understanding of bowel prep, arrival time and that patient has a .

## 2020-01-06 ENCOUNTER — ANESTHESIA EVENT (OUTPATIENT)
Dept: OPERATING ROOM | Age: 65
End: 2020-01-06
Payer: COMMERCIAL

## 2020-01-06 ENCOUNTER — ANESTHESIA (OUTPATIENT)
Dept: OPERATING ROOM | Age: 65
End: 2020-01-06
Payer: COMMERCIAL

## 2020-01-06 ENCOUNTER — HOSPITAL ENCOUNTER (OUTPATIENT)
Age: 65
Setting detail: OUTPATIENT SURGERY
Discharge: HOME | End: 2020-01-06
Attending: INTERNAL MEDICINE | Admitting: INTERNAL MEDICINE
Payer: COMMERCIAL

## 2020-01-06 VITALS
DIASTOLIC BLOOD PRESSURE: 74 MMHG | SYSTOLIC BLOOD PRESSURE: 167 MMHG | OXYGEN SATURATION: 99 % | RESPIRATION RATE: 23 BRPM

## 2020-01-06 VITALS
WEIGHT: 220.46 LBS | OXYGEN SATURATION: 99 % | TEMPERATURE: 97.3 F | BODY MASS INDEX: 30.86 KG/M2 | HEIGHT: 71 IN | RESPIRATION RATE: 16 BRPM | DIASTOLIC BLOOD PRESSURE: 71 MMHG | HEART RATE: 62 BPM | SYSTOLIC BLOOD PRESSURE: 135 MMHG

## 2020-01-06 PROCEDURE — 2500000003 HC RX 250 WO HCPCS: Performed by: NURSE ANESTHETIST, CERTIFIED REGISTERED

## 2020-01-06 PROCEDURE — 7100000011 HC PHASE II RECOVERY - ADDTL 15 MIN: Performed by: INTERNAL MEDICINE

## 2020-01-06 PROCEDURE — 45385 COLONOSCOPY W/LESION REMOVAL: CPT | Performed by: INTERNAL MEDICINE

## 2020-01-06 PROCEDURE — 88305 TISSUE EXAM BY PATHOLOGIST: CPT

## 2020-01-06 PROCEDURE — 3700000001 HC ADD 15 MINUTES (ANESTHESIA): Performed by: INTERNAL MEDICINE

## 2020-01-06 PROCEDURE — 3700000000 HC ANESTHESIA ATTENDED CARE: Performed by: INTERNAL MEDICINE

## 2020-01-06 PROCEDURE — 2709999900 HC NON-CHARGEABLE SUPPLY: Performed by: INTERNAL MEDICINE

## 2020-01-06 PROCEDURE — 2580000003 HC RX 258: Performed by: NURSE ANESTHETIST, CERTIFIED REGISTERED

## 2020-01-06 PROCEDURE — 2580000003 HC RX 258: Performed by: ANESTHESIOLOGY

## 2020-01-06 PROCEDURE — 7100000010 HC PHASE II RECOVERY - FIRST 15 MIN: Performed by: INTERNAL MEDICINE

## 2020-01-06 PROCEDURE — 3609010300 HC COLONOSCOPY W/BIOPSY SINGLE/MULTIPLE: Performed by: INTERNAL MEDICINE

## 2020-01-06 PROCEDURE — 6360000002 HC RX W HCPCS: Performed by: NURSE ANESTHETIST, CERTIFIED REGISTERED

## 2020-01-06 RX ORDER — PROPOFOL 10 MG/ML
INJECTION, EMULSION INTRAVENOUS PRN
Status: DISCONTINUED | OUTPATIENT
Start: 2020-01-06 | End: 2020-01-06 | Stop reason: SDUPTHER

## 2020-01-06 RX ORDER — LIDOCAINE HYDROCHLORIDE 20 MG/ML
INJECTION, SOLUTION INFILTRATION; PERINEURAL PRN
Status: DISCONTINUED | OUTPATIENT
Start: 2020-01-06 | End: 2020-01-06 | Stop reason: SDUPTHER

## 2020-01-06 RX ORDER — SODIUM CHLORIDE, SODIUM LACTATE, POTASSIUM CHLORIDE, CALCIUM CHLORIDE 600; 310; 30; 20 MG/100ML; MG/100ML; MG/100ML; MG/100ML
INJECTION, SOLUTION INTRAVENOUS CONTINUOUS
Status: DISCONTINUED | OUTPATIENT
Start: 2020-01-06 | End: 2020-01-06 | Stop reason: HOSPADM

## 2020-01-06 RX ORDER — SODIUM CHLORIDE, SODIUM LACTATE, POTASSIUM CHLORIDE, CALCIUM CHLORIDE 600; 310; 30; 20 MG/100ML; MG/100ML; MG/100ML; MG/100ML
INJECTION, SOLUTION INTRAVENOUS CONTINUOUS PRN
Status: DISCONTINUED | OUTPATIENT
Start: 2020-01-06 | End: 2020-01-06 | Stop reason: SDUPTHER

## 2020-01-06 RX ORDER — ACETAMINOPHEN 500 MG
1500 TABLET ORAL 3 TIMES DAILY
COMMUNITY
End: 2020-02-24

## 2020-01-06 RX ADMIN — PROPOFOL 50 MG: 10 INJECTION, EMULSION INTRAVENOUS at 10:11

## 2020-01-06 RX ADMIN — PROPOFOL 50 MG: 10 INJECTION, EMULSION INTRAVENOUS at 10:07

## 2020-01-06 RX ADMIN — PROPOFOL 80 MG: 10 INJECTION, EMULSION INTRAVENOUS at 10:02

## 2020-01-06 RX ADMIN — PROPOFOL 50 MG: 10 INJECTION, EMULSION INTRAVENOUS at 10:15

## 2020-01-06 RX ADMIN — LIDOCAINE HYDROCHLORIDE 60 MG: 20 INJECTION, SOLUTION INFILTRATION; PERINEURAL at 10:02

## 2020-01-06 RX ADMIN — SODIUM CHLORIDE, POTASSIUM CHLORIDE, SODIUM LACTATE AND CALCIUM CHLORIDE: 600; 310; 30; 20 INJECTION, SOLUTION INTRAVENOUS at 09:58

## 2020-01-06 RX ADMIN — SODIUM CHLORIDE, POTASSIUM CHLORIDE, SODIUM LACTATE AND CALCIUM CHLORIDE: 600; 310; 30; 20 INJECTION, SOLUTION INTRAVENOUS at 09:47

## 2020-01-06 ASSESSMENT — PULMONARY FUNCTION TESTS
PIF_VALUE: 1

## 2020-01-06 ASSESSMENT — PAIN - FUNCTIONAL ASSESSMENT: PAIN_FUNCTIONAL_ASSESSMENT: 0-10

## 2020-01-06 ASSESSMENT — PAIN SCALES - GENERAL
PAINLEVEL_OUTOF10: 0

## 2020-01-06 NOTE — ANESTHESIA PRE PROCEDURE
Pulmonary:normal exam        (-) COPD and asthma                           Cardiovascular:  Exercise tolerance: no interval change,       (-) pacemaker, past MI and CAD        Rate: normal                    Neuro/Psych:   (+) psychiatric history:            GI/Hepatic/Renal:        (-) GERD       Endo/Other:    (+) : arthritis:., .                 Abdominal:           Vascular:                                        Anesthesia Plan      MAC and general     ASA 2       Induction: intravenous. Anesthetic plan and risks discussed with patient. Plan discussed with CRNA.     Attending anesthesiologist reviewed and agrees with Pre Eval content              Hector Archuleta DO   1/6/2020

## 2020-01-06 NOTE — ANESTHESIA POSTPROCEDURE EVALUATION
Department of Anesthesiology  Postprocedure Note    Patient: Mag Smith  MRN: [id number]  YOB: 1955  Date of evaluation: 1/6/2020  Time:  12:26 PM     Procedure Summary     Date:  01/06/20 Room / Location:  [address]    Anesthesia Start:  4546 Anesthesia Stop:  6890    Procedure:  COLONOSCOPY WITH BIOPSY (N/A ) Diagnosis:  (DX SCREENING)    Surgeon:  Boby Jimenez MD Responsible Provider:  Arlette Bertrand DO    Anesthesia Type:  general ASA Status:  2          Anesthesia Type: general    Charisse Phase I: Charisse Score: 10    Charisse Phase II: Charisse Score: 8    Last vitals: Reviewed and per EMR flowsheets.        Anesthesia Post Evaluation    Patient location during evaluation: PACU  Patient participation: complete - patient participated  Level of consciousness: awake and alert  Airway patency: patent  Nausea & Vomiting: no nausea and no vomiting  Complications: no  Cardiovascular status: hemodynamically stable  Respiratory status: acceptable  Hydration status: stable

## 2020-01-06 NOTE — OP NOTE
[facility]     PROCEDURE DATE: 01/06/20    REFERRING PHYSICIAN: No ref. provider found     PRIMARY CARE PROVIDER: Javier Miller PA-C    ATTENDING PHYSICIAN: Genesis Glynn MD     HISTORY: Mr. Saurabh Nazario is a 59 y.o. male who presents to the [place] [address] Endoscopy unit for colonoscopy. The patient's clinical history is remarkable for FIT+. He is currently medically stable and appropriate for the planned procedure. PREOPERATIVE DIAGNOSIS: FIT+. PROCEDURES:   Transanal Colonoscopy with polypectomy (cold snare), polypectomy (snare cautery). POSTPROCEDURE DIAGNOSIS:  4 polyps  Sub-optimal prep    MEDICATIONS:     MAC per anesthesia    EBL: minimal    INSTRUMENT: Olympus PCF- AL flexible Colonoscope. PREPARATION: The nature and character of the procedure as well as risks, benefits, and alternatives were discussed with the patient and informed consent was obtained. Complications were said to include, but were not limited to: medication allergy, medication reaction, cardiovascular and respiratory problems, bleeding, perforation, infection, and/or missed diagnosis. Following arrival in the endoscopy room, the patient was placed in the left lateral decubitus position and final time-out accomplished in the presence of the nursing staff. Baseline vital signs were obtained and reviewed, and IV sedation was subsequently initiated. FINDINGS: Rectal examination demonstrated no significant visible external abnormality and digital palpation was unremarkable. Following adequate conscious sedation the colonoscope was introduced and advanced under direct visualization to the cecum, which was identified by the ileocecal valve and appendiceal orifice. The bowel preparation was felt to be suboptimal. This included small amounts of thick stool that was not able to be adequately irrigated and aspirated. Cecal intubation time was 3 minutes.      Once maximally inserted, the

## 2020-01-06 NOTE — H&P
breath, cough, congestion, wheezing. CARDIOVASCULAR:  negative for chest pain, palpitations. GASTROINTESTINAL:  negative for nausea, vomiting, diarrhea, constipation, change in bowel habits, abdominal pain   GENITOURINARY:  negative for difficulty of urination, burning with urination, frequency   INTEGUMENT:  negative for rash, skin lesions, easy bruising   HEMATOLOGIC/LYMPHATIC:  negative for swelling/edema   ALLERGIC/IMMUNOLOGIC:  negative for urticaria , itching  ENDOCRINE:  negative increase in drinking, increase in urination, hot or cold intolerance  MUSCULOSKELETAL:  negative joint pains, muscle aches, swelling of joints  NEUROLOGICAL:  negative for headaches, dizziness, lightheadedness, numbness, pain, tingling extremities  BEHAVIOR/PSYCH:  negative for depression, anxiety    Physical Exam:   BP (!) 142/82   Pulse 74   Temp 98.1 °F (36.7 °C) (Oral)   Resp 16   SpO2 94%       General Appearance:  alert, well appearing, and in no acute distress  Mental status: oriented to person, place, and time with normal affect  Head:  normocephalic, atraumatic. Eye: no icterus, redness, pupils equal and reactive, extraocular eye movements intact, conjunctiva clear  Ear: normal external ear, no discharge, hearing intact  Nose:  no drainage noted  Mouth: mucous membranes moist  Neck: supple, no carotid bruits, thyroid not palpable  Lungs: Bilateral equal air entry, clear to ausculation, no wheezing, rales or rhonchi, normal effort  Cardiovascular: normal rate, regular rhythm, no murmur, gallop, rub.   Abdomen: Soft, nontender, nondistended, normal bowel sounds, no hepatomegaly or splenomegaly  Neurologic: There are no new focal motor or sensory deficits, normal muscle tone and bulk, no abnormal sensation, normal speech, cranial nerves II through XII grossly intact  Skin: No gross lesions, rashes, bruising or bleeding on exposed skin area  Extremities:  peripheral pulses palpable, no pedal edema or calf pain with palpation  Psych: normal affect         Diagnosis:      Screening colonoscopy      MAKSIM Woodall CNP  1/6/2020  9:31 AM

## 2020-01-07 LAB — SURGICAL PATHOLOGY REPORT: NORMAL

## 2020-01-08 RX ORDER — CELECOXIB 100 MG/1
100 CAPSULE ORAL 2 TIMES DAILY
Qty: 180 CAPSULE | Refills: 0 | Status: SHIPPED | OUTPATIENT
Start: 2020-01-08 | End: 2020-02-24

## 2020-01-15 ENCOUNTER — TELEPHONE (OUTPATIENT)
Dept: FAMILY MEDICINE CLINIC | Age: 65
End: 2020-01-15

## 2020-01-23 ENCOUNTER — TELEPHONE (OUTPATIENT)
Dept: FAMILY MEDICINE CLINIC | Age: 65
End: 2020-01-23

## 2020-01-29 ENCOUNTER — TELEPHONE (OUTPATIENT)
Dept: FAMILY MEDICINE CLINIC | Age: 65
End: 2020-01-29

## 2020-01-29 NOTE — TELEPHONE ENCOUNTER
Pt called. He is still unable to get his Celebrex from the pharmacy, and does not want a generic equivalent. Spoke with pharmacy, and they are stating that due to the prescription being a 90 day supply with the pills being taken twice daily that this med does require a prior auth. They stated they will fax over the PA request.  Pt is very irritated as he has been having to pay cash for his medication when it should be covered by the insurance, and continues to hear different things from the pharmacy and the insurance and just wants his medicine back. Please advise. Thank you. Tried to contact [facility] services again, and was re-routed to several areas before it was stated that my call could not be completed and to hang up.

## 2020-01-29 NOTE — TELEPHONE ENCOUNTER
Called the pt's pharmacy, as they had 100mg Celebrex instead of 200mg. With the 200mg Celebrex prescription, I asked if the 90 day supply was covered with 180 tabs and they stated it was, but the pt's deductible has not been met for the new year so in order to  the prescription there would be $2000 due up front. Attempted to notify pt, but left a vm to call the office. We can try a similar drug on his formulary if he would like that option?

## 2020-02-10 ENCOUNTER — TELEPHONE (OUTPATIENT)
Dept: FAMILY MEDICINE CLINIC | Age: 65
End: 2020-02-10

## 2020-02-24 ENCOUNTER — OFFICE VISIT (OUTPATIENT)
Dept: FAMILY MEDICINE CLINIC | Age: 65
End: 2020-02-24
Payer: COMMERCIAL

## 2020-02-24 VITALS
OXYGEN SATURATION: 98 % | DIASTOLIC BLOOD PRESSURE: 68 MMHG | WEIGHT: 225.2 LBS | BODY MASS INDEX: 31.53 KG/M2 | TEMPERATURE: 98 F | HEART RATE: 72 BPM | HEIGHT: 71 IN | SYSTOLIC BLOOD PRESSURE: 147 MMHG

## 2020-02-24 PROBLEM — R03.0 ELEVATED BP WITHOUT DIAGNOSIS OF HYPERTENSION: Status: ACTIVE | Noted: 2020-02-24

## 2020-02-24 PROCEDURE — 93000 ELECTROCARDIOGRAM COMPLETE: CPT | Performed by: FAMILY MEDICINE

## 2020-02-24 PROCEDURE — G0444 DEPRESSION SCREEN ANNUAL: HCPCS | Performed by: FAMILY MEDICINE

## 2020-02-24 PROCEDURE — G8431 POS CLIN DEPRES SCRN F/U DOC: HCPCS | Performed by: FAMILY MEDICINE

## 2020-02-24 PROCEDURE — 99213 OFFICE O/P EST LOW 20 MIN: CPT | Performed by: FAMILY MEDICINE

## 2020-02-24 RX ORDER — TAMSULOSIN HYDROCHLORIDE 0.4 MG/1
CAPSULE ORAL
COMMUNITY
Start: 2020-01-02 | End: 2020-06-12 | Stop reason: SDUPTHER

## 2020-02-24 ASSESSMENT — ENCOUNTER SYMPTOMS
EYES NEGATIVE: 1
GASTROINTESTINAL NEGATIVE: 1
COUGH: 1

## 2020-02-24 ASSESSMENT — PATIENT HEALTH QUESTIONNAIRE - PHQ9
SUM OF ALL RESPONSES TO PHQ QUESTIONS 1-9: 10
6. FEELING BAD ABOUT YOURSELF - OR THAT YOU ARE A FAILURE OR HAVE LET YOURSELF OR YOUR FAMILY DOWN: 1
10. IF YOU CHECKED OFF ANY PROBLEMS, HOW DIFFICULT HAVE THESE PROBLEMS MADE IT FOR YOU TO DO YOUR WORK, TAKE CARE OF THINGS AT HOME, OR GET ALONG WITH OTHER PEOPLE: 1
1. LITTLE INTEREST OR PLEASURE IN DOING THINGS: 2
9. THOUGHTS THAT YOU WOULD BE BETTER OFF DEAD, OR OF HURTING YOURSELF: 0
SUM OF ALL RESPONSES TO PHQ QUESTIONS 1-9: 10
4. FEELING TIRED OR HAVING LITTLE ENERGY: 2
3. TROUBLE FALLING OR STAYING ASLEEP: 2
7. TROUBLE CONCENTRATING ON THINGS, SUCH AS READING THE NEWSPAPER OR WATCHING TELEVISION: 0
SUM OF ALL RESPONSES TO PHQ9 QUESTIONS 1 & 2: 4
2. FEELING DOWN, DEPRESSED OR HOPELESS: 2
5. POOR APPETITE OR OVEREATING: 0
8. MOVING OR SPEAKING SO SLOWLY THAT OTHER PEOPLE COULD HAVE NOTICED. OR THE OPPOSITE, BEING SO FIGETY OR RESTLESS THAT YOU HAVE BEEN MOVING AROUND A LOT MORE THAN USUAL: 1

## 2020-02-24 NOTE — PROGRESS NOTES
 Diabetes type 2  Father     COPD Brother     No Known Problems Maternal Grandmother     No Known Problems Maternal Grandfather     No Known Problems Paternal Grandmother     No Known Problems Paternal Grandfather     Obesity Maternal Cousin     Heart Attack Brother        Social History     Tobacco Use    Smoking status: Never Smoker    Smokeless tobacco: Never Used   Substance Use Topics    Alcohol use: Not Currently     Alcohol/week: 0.0 standard drinks      Current Outpatient Medications   Medication Sig Dispense Refill    tamsulosin (FLOMAX) 0.4 MG capsule       zoster recombinant adjuvanted vaccine (SHINGRIX) 50 MCG/0.5ML SUSR injection Inject 0.5 mLs into the muscle once for 1 dose 50 MCG IM then repeat 2-6 months. 1 each 1    CELEBREX 200 MG capsule Take 1 capsule by mouth 2 times daily 120 capsule 1    ibuprofen (ADVIL;MOTRIN) 200 MG tablet Take 200 mg by mouth every 6 hours as needed for Pain       No current facility-administered medications for this visit. Allergies   Allergen Reactions    Codeine      Uncomfortable feeling- sea sick feeling    Hydrocodone Nausea Only    No Known Allergies        Health Maintenance   Topic Date Due    Shingles Vaccine (1 of 2) 03/24/2005    Diabetes screen  05/17/2019    Flu vaccine (1) 11/08/2020 (Originally 9/1/2019)    Colon cancer screen colonoscopy  01/06/2023    Lipid screen  11/19/2024    DTaP/Tdap/Td vaccine (2 - Td) 06/14/2027    Hepatitis C screen  Completed    HIV screen  Completed    Hepatitis A vaccine  Aged Out    Hepatitis B vaccine  Aged Out    Hib vaccine  Aged Out    Meningococcal (ACWY) vaccine  Aged Out    Pneumococcal 0-64 years Vaccine  Aged Out   Reviewed    Subjective:     Review of Systems   Constitutional: Negative. HENT: Negative. Eyes: Negative. 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 Cardiovascular: Negative. and asap with counselor he is already established with.

## 2020-06-09 ENCOUNTER — TELEPHONE (OUTPATIENT)
Dept: FAMILY MEDICINE CLINIC | Age: 65
End: 2020-06-09

## 2020-06-10 NOTE — TELEPHONE ENCOUNTER
Order for COVID testing can be placed in [facility] and the patient can go to any [facility] lab.

## 2020-06-10 NOTE — TELEPHONE ENCOUNTER
Patient can go get COVID testing. Is the flu clinic still open? If not where are we sending patient's for COVID testing?

## 2020-06-12 ENCOUNTER — TELEPHONE (OUTPATIENT)
Dept: FAMILY MEDICINE CLINIC | Age: 65
End: 2020-06-12

## 2020-06-12 ENCOUNTER — HOSPITAL ENCOUNTER (OUTPATIENT)
Facility: CLINIC | Age: 65
Discharge: HOME | End: 2020-06-12
Payer: MEDICARE

## 2020-06-12 LAB
ABSOLUTE EOS #: 0.31 K/UL (ref 0–0.44)
ABSOLUTE IMMATURE GRANULOCYTE: <0.03 K/UL (ref 0–0.3)
ABSOLUTE LYMPH #: 1.46 K/UL (ref 1.1–3.7)
ABSOLUTE MONO #: 0.42 K/UL (ref 0.1–1.2)
ALBUMIN SERPL-MCNC: 4.1 G/DL (ref 3.5–5.2)
ALBUMIN/GLOBULIN RATIO: 1.2 (ref 1–2.5)
ALP BLD-CCNC: 98 U/L (ref 40–129)
ALT SERPL-CCNC: 11 U/L (ref 5–41)
ANION GAP SERPL CALCULATED.3IONS-SCNC: 11 MMOL/L (ref 9–17)
AST SERPL-CCNC: 11 U/L
BASOPHILS # BLD: 1 % (ref 0–2)
BASOPHILS ABSOLUTE: 0.07 K/UL (ref 0–0.2)
BILIRUB SERPL-MCNC: 0.18 MG/DL (ref 0.3–1.2)
BUN BLDV-MCNC: 17 MG/DL (ref 8–23)
BUN/CREAT BLD: ABNORMAL (ref 9–20)
CALCIUM SERPL-MCNC: 8.9 MG/DL (ref 8.6–10.4)
CHLORIDE BLD-SCNC: 105 MMOL/L (ref 98–107)
CO2: 25 MMOL/L (ref 20–31)
CREAT SERPL-MCNC: 0.98 MG/DL (ref 0.7–1.2)
DIFFERENTIAL TYPE: ABNORMAL
EOSINOPHILS RELATIVE PERCENT: 6 % (ref 1–4)
GFR AFRICAN AMERICAN: >60 ML/MIN
GFR NON-AFRICAN AMERICAN: >60 ML/MIN
GFR SERPL CREATININE-BSD FRML MDRD: ABNORMAL ML/MIN/{1.73_M2}
GFR SERPL CREATININE-BSD FRML MDRD: ABNORMAL ML/MIN/{1.73_M2}
GLUCOSE BLD-MCNC: 100 MG/DL (ref 70–99)
HCT VFR BLD CALC: 49.7 % (ref 40.7–50.3)
HEMOGLOBIN: 15.5 G/DL (ref 13–17)
IMMATURE GRANULOCYTES: 0 %
LYMPHOCYTES # BLD: 26 % (ref 24–43)
MCH RBC QN AUTO: 28.2 PG (ref 25.2–33.5)
MCHC RBC AUTO-ENTMCNC: 31.2 G/DL (ref 28.4–34.8)
MCV RBC AUTO: 90.5 FL (ref 82.6–102.9)
MONOCYTES # BLD: 8 % (ref 3–12)
NRBC AUTOMATED: 0 PER 100 WBC
PDW BLD-RTO: 14.1 % (ref 11.8–14.4)
PLATELET # BLD: 272 K/UL (ref 138–453)
PLATELET ESTIMATE: ABNORMAL
PMV BLD AUTO: 10.1 FL (ref 8.1–13.5)
POTASSIUM SERPL-SCNC: 4.8 MMOL/L (ref 3.7–5.3)
RBC # BLD: 5.49 M/UL (ref 4.21–5.77)
RBC # BLD: ABNORMAL 10*6/UL
SARS-COV-2 ANTIBODY, TOTAL: NEGATIVE
SEG NEUTROPHILS: 59 % (ref 36–65)
SEGMENTED NEUTROPHILS ABSOLUTE COUNT: 3.34 K/UL (ref 1.5–8.1)
SODIUM BLD-SCNC: 141 MMOL/L (ref 135–144)
TOTAL PROTEIN: 7.4 G/DL (ref 6.4–8.3)
WBC # BLD: 5.6 K/UL (ref 3.5–11.3)
WBC # BLD: ABNORMAL 10*3/UL

## 2020-06-12 PROCEDURE — 36415 COLL VENOUS BLD VENIPUNCTURE: CPT

## 2020-06-12 PROCEDURE — 80053 COMPREHEN METABOLIC PANEL: CPT

## 2020-06-12 PROCEDURE — 85025 COMPLETE CBC W/AUTO DIFF WBC: CPT

## 2020-06-12 PROCEDURE — 86769 SARS-COV-2 COVID-19 ANTIBODY: CPT

## 2020-06-12 RX ORDER — TAMSULOSIN HYDROCHLORIDE 0.4 MG/1
0.4 CAPSULE ORAL DAILY
Qty: 90 CAPSULE | Refills: 0 | Status: SHIPPED | OUTPATIENT
Start: 2020-06-12 | End: 2021-02-10 | Stop reason: SDUPTHER

## 2021-02-10 ENCOUNTER — OFFICE VISIT (OUTPATIENT)
Dept: FAMILY MEDICINE CLINIC | Age: 66
End: 2021-02-10
Payer: MEDICARE

## 2021-02-10 VITALS
HEART RATE: 77 BPM | WEIGHT: 233 LBS | TEMPERATURE: 97.1 F | HEIGHT: 71 IN | SYSTOLIC BLOOD PRESSURE: 152 MMHG | DIASTOLIC BLOOD PRESSURE: 72 MMHG | BODY MASS INDEX: 32.62 KG/M2

## 2021-02-10 DIAGNOSIS — M15.9 PRIMARY OSTEOARTHRITIS INVOLVING MULTIPLE JOINTS: ICD-10-CM

## 2021-02-10 DIAGNOSIS — N20.0 RENAL STONE: ICD-10-CM

## 2021-02-10 DIAGNOSIS — Z23 NEED FOR PNEUMOCOCCAL VACCINE: ICD-10-CM

## 2021-02-10 DIAGNOSIS — N40.1 LOWER URINARY TRACT SYMPTOMS DUE TO BENIGN PROSTATIC HYPERPLASIA: ICD-10-CM

## 2021-02-10 DIAGNOSIS — I10 ESSENTIAL (PRIMARY) HYPERTENSION: Primary | ICD-10-CM

## 2021-02-10 DIAGNOSIS — M17.11 UNILATERAL PRIMARY OSTEOARTHRITIS, RIGHT KNEE: ICD-10-CM

## 2021-02-10 DIAGNOSIS — R19.03 RIGHT LOWER QUADRANT ABDOMINAL MASS: ICD-10-CM

## 2021-02-10 DIAGNOSIS — E66.09 CLASS 1 OBESITY DUE TO EXCESS CALORIES WITH SERIOUS COMORBIDITY AND BODY MASS INDEX (BMI) OF 32.0 TO 32.9 IN ADULT: ICD-10-CM

## 2021-02-10 DIAGNOSIS — Z86.39 HISTORY OF HYPERGLYCEMIA: ICD-10-CM

## 2021-02-10 DIAGNOSIS — Z13.1 DIABETES MELLITUS SCREENING: ICD-10-CM

## 2021-02-10 DIAGNOSIS — N40.0 BENIGN PROSTATIC HYPERPLASIA WITHOUT URINARY OBSTRUCTION: ICD-10-CM

## 2021-02-10 DIAGNOSIS — F34.1 DYSTHYMIA: ICD-10-CM

## 2021-02-10 DIAGNOSIS — Z13.220 LIPID SCREENING: ICD-10-CM

## 2021-02-10 PROBLEM — M25.561 CHRONIC PAIN OF RIGHT KNEE: Status: RESOLVED | Noted: 2019-12-05 | Resolved: 2021-02-10

## 2021-02-10 PROBLEM — G89.29 CHRONIC PAIN OF RIGHT KNEE: Status: RESOLVED | Noted: 2019-12-05 | Resolved: 2021-02-10

## 2021-02-10 PROBLEM — N39.41 URGE URINARY INCONTINENCE: Status: ACTIVE | Noted: 2021-02-10

## 2021-02-10 PROCEDURE — G8484 FLU IMMUNIZE NO ADMIN: HCPCS | Performed by: FAMILY MEDICINE

## 2021-02-10 PROCEDURE — G8417 CALC BMI ABV UP PARAM F/U: HCPCS | Performed by: FAMILY MEDICINE

## 2021-02-10 PROCEDURE — G8427 DOCREV CUR MEDS BY ELIG CLIN: HCPCS | Performed by: FAMILY MEDICINE

## 2021-02-10 PROCEDURE — 3017F COLORECTAL CA SCREEN DOC REV: CPT | Performed by: FAMILY MEDICINE

## 2021-02-10 PROCEDURE — 1036F TOBACCO NON-USER: CPT | Performed by: FAMILY MEDICINE

## 2021-02-10 PROCEDURE — 4040F PNEUMOC VAC/ADMIN/RCVD: CPT | Performed by: FAMILY MEDICINE

## 2021-02-10 PROCEDURE — 99214 OFFICE O/P EST MOD 30 MIN: CPT | Performed by: FAMILY MEDICINE

## 2021-02-10 PROCEDURE — 90670 PCV13 VACCINE IM: CPT | Performed by: FAMILY MEDICINE

## 2021-02-10 PROCEDURE — G0009 ADMIN PNEUMOCOCCAL VACCINE: HCPCS | Performed by: FAMILY MEDICINE

## 2021-02-10 PROCEDURE — 1123F ACP DISCUSS/DSCN MKR DOCD: CPT | Performed by: FAMILY MEDICINE

## 2021-02-10 RX ORDER — TAMSULOSIN HYDROCHLORIDE 0.4 MG/1
0.4 CAPSULE ORAL DAILY
Qty: 90 CAPSULE | Refills: 0 | Status: SHIPPED | OUTPATIENT
Start: 2021-02-10 | End: 2021-05-08

## 2021-02-10 SDOH — ECONOMIC STABILITY: INCOME INSECURITY: HOW HARD IS IT FOR YOU TO PAY FOR THE VERY BASICS LIKE FOOD, HOUSING, MEDICAL CARE, AND HEATING?: NOT HARD AT ALL

## 2021-02-10 SDOH — ECONOMIC STABILITY: TRANSPORTATION INSECURITY
IN THE PAST 12 MONTHS, HAS THE LACK OF TRANSPORTATION KEPT YOU FROM MEDICAL APPOINTMENTS OR FROM GETTING MEDICATIONS?: NO

## 2021-02-10 SDOH — ECONOMIC STABILITY: TRANSPORTATION INSECURITY
IN THE PAST 12 MONTHS, HAS LACK OF TRANSPORTATION KEPT YOU FROM MEETINGS, WORK, OR FROM GETTING THINGS NEEDED FOR DAILY LIVING?: NO

## 2021-02-10 SDOH — ECONOMIC STABILITY: FOOD INSECURITY: WITHIN THE PAST 12 MONTHS, THE FOOD YOU BOUGHT JUST DIDN'T LAST AND YOU DIDN'T HAVE MONEY TO GET MORE.: NEVER TRUE

## 2021-02-10 SDOH — ECONOMIC STABILITY: FOOD INSECURITY: WITHIN THE PAST 12 MONTHS, YOU WORRIED THAT YOUR FOOD WOULD RUN OUT BEFORE YOU GOT MONEY TO BUY MORE.: NEVER TRUE

## 2021-02-10 ASSESSMENT — ENCOUNTER SYMPTOMS
BLOOD IN STOOL: 0
ABDOMINAL DISTENTION: 0
DIARRHEA: 0
VOMITING: 0
ANAL BLEEDING: 0
CONSTIPATION: 0
EYES NEGATIVE: 1
RESPIRATORY NEGATIVE: 1
NAUSEA: 0
RECTAL PAIN: 0
ABDOMINAL PAIN: 1

## 2021-02-10 NOTE — PROGRESS NOTES
[address] PRIMARY CARE  [address] [place] [address] [address] [place] [address]  Dept: [phone]  Dept Fax: [phone]    Sharmin Thomas is a 72 y.o. male who is a Established patient, who presents today for his medical conditions/complaints as noted below:  Chief Complaint   Patient presents with    Hypertension    Other     lump on right side, when he stretches it is painful. x 2 months         HPI:     The patient complains of left ear hearing changes where he will hear a suction nose and feels a fullness in the ear as well. He wonders if he has some earwax blocked back. The patient noticed a bump on his right side. He states it feels like it is inside his stomach and it on occasion it feels like a discomfort. The patient feels it at night time in bed as well. The other day he stretched and it grabbed him with pain and went up his arm into his chest.  He notes he can feel the mass when he moves around in bed. The patient does have a history of high blood pressure but is refusing treatment at this time. He checks his blood pressure at home and states it is in the 155Y systolic on a regular basis. He is nervous about taking medications as he worries about the side effects. The patient denies any chest pain, shortness of breath, nausea, vomiting, blurry vision, slurred speech, or any strokelike symptoms. The patient was counseled about the importance of vaccinations but would only get the Pneumovax today. He declined the flu vaccine. The patient did undergo a right total knee arthroplasty March 2020. He feels significantly better and has better range of motion and pain control in the right knee from the osteoarthritis. He did note a prolonged course after the operation with pain but notes he managed it with range of motion and over-the-counter medications.       No results found for: LABA1C          ( goal A1Cis < 7)   No results found for: LABMICR  LDL Cholesterol (mg/dL)   Date Value   11/19/2019 109     LDL Calculated (mg/dL)   Date Value   04/18/2017 122   06/07/2016 156       (goal LDL is <100)   AST (U/L)   Date Value   06/12/2020 11     ALT (U/L)   Date Value   06/12/2020 11     BUN (mg/dL)   Date Value   06/12/2020 17     BP Readings from Last 3 Encounters:   02/10/21 (!) 152/72   02/24/20 (!) 147/68   01/06/20 135/71          (goal 120/80)    Past Medical History:   Diagnosis Date    Chronic pain of right knee 12/5/2019    Depression     Knee pain     Night sweats     Osteoarthritis     Chronic    Weight loss       Past Surgical History:   Procedure Laterality Date    CARPAL TUNNEL RELEASE Bilateral     1990's    COLONOSCOPY N/A 1/6/2020    COLONOSCOPY WITH BIOPSY performed by Adriana Bravo MD at [facility] ARTHROSCOPY Right     multiple times    ROTATOR CUFF REPAIR Left        Family History   Problem Relation Age of Onset    No Known Problems Mother     Cancer Father     Diabetes type 2  Father     COPD Brother     No Known Problems Maternal Grandmother     No Known Problems Maternal Grandfather     No Known Problems Paternal Grandmother     No Known Problems Paternal Grandfather     Obesity Maternal Cousin     Heart Attack Brother        Social History     Tobacco Use    Smoking status: Never Smoker    Smokeless tobacco: Never Used   Substance Use Topics    Alcohol use: Not Currently     Alcohol/week: 0.0 standard drinks      Current Outpatient Medications   Medication Sig Dispense Refill    CELEBREX 200 MG capsule Take 1 capsule by mouth 2 times daily 180 capsule 1    tamsulosin (FLOMAX) 0.4 MG capsule Take 1 capsule by mouth daily 90 capsule 0     No current facility-administered medications for this visit.       Allergies   Allergen Reactions    Codeine      Uncomfortable feeling- sea sick feeling    Hydrocodone Nausea Only    No Known Allergies        Health Maintenance   Topic Date Due    COVID-19 Vaccine (1 of 2) 03/24/1971 Heart sounds: Murmur (2/6 systolic) present. No friction rub. No gallop. Pulmonary:      Effort: Pulmonary effort is normal. No respiratory distress. Breath sounds: Normal breath sounds. No wheezing or rhonchi. Abdominal:      General: Abdomen is flat. Bowel sounds are normal.      Palpations: Abdomen is soft. Tenderness: There is abdominal tenderness (right side, no rebound, guarding or rigidity). Musculoskeletal: Normal range of motion. Skin:     General: Skin is warm and dry. Capillary Refill: Capillary refill takes less than 2 seconds. Neurological:      General: No focal deficit present. Mental Status: He is alert and oriented to person, place, and time. Mental status is at baseline. Cranial Nerves: No cranial nerve deficit. Sensory: No sensory deficit. Motor: No weakness. Coordination: Coordination normal.   Psychiatric:         Attention and Perception: Attention normal.         Mood and Affect: Mood and affect normal.         Speech: Speech normal.         Behavior: Behavior normal.         Thought Content: Thought content normal.         Judgment: Judgment normal.       BP (!) 152/72 (Site: Left Upper Arm, Position: Sitting, Cuff Size: Medium Adult)   Pulse 77   Temp 97.1 °F (36.2 °C) (Temporal)   Ht 5' 11\" (1.803 m)   Wt 233 lb (105.7 kg)   BMI 32.50 kg/m²     Assessment:       Diagnosis Orders   1. Essential (primary) hypertension   TSH With Reflex Ft4    CBC Auto Differential   2. Class 1 obesity due to excess calories with serious comorbidity and body mass index (BMI) of 32.0 to 32.9 in adult     3. Right lower quadrant abdominal mass  CT ABDOMEN PELVIS W IV CONTRAST Additional Contrast? Oral   4. Primary osteoarthritis involving multiple joints  CELEBREX 200 MG capsule   5. Benign prostatic hyperplasia without urinary obstruction     6. Lower urinary tract symptoms due to benign prostatic hyperplasia  tamsulosin (FLOMAX) 0.4 MG capsule   7. Unilateral primary osteoarthritis, right knee     8. History of hyperglycemia  Comprehensive Metabolic Panel, Fasting    Hemoglobin A1C   9. Dysthymia  TSH With Reflex Ft4    CBC Auto Differential   10. Lipid screening  Lipid, Fasting   11. Diabetes mellitus screening  Comprehensive Metabolic Panel, Fasting    Hemoglobin A1C   12. Need for pneumococcal vaccine  PREVNAR 13 IM (Pneumococcal conjugate vaccine 13-valent)             Plan:    Hypertensionpatient encouraged to get fasting labs done as discussed. Chava with him the importance of treatment to decrease the risk of stroke, heart attack, ASCVD risk in general.  The patient at this time continues to refuse medications or medication management. I will have him return in 2 weeks for blood pressure check, if the blood pressure is not improved at that time he will go on medication. Patient encouraged to monitor for any signs of stroke or heart attack. I also encouraged the patient to pursue a low-salt diet, exercise regularly, and avoid any smoking. Right lower quadrant abdominal masson examination I could not appreciate a mass, he was slightly tender to palpation on the right side. Due to the symptoms as well as the concern for a mass I will order a CT abdomen and pelvis with contrast.  I have also ordered fasting labs for him to get a renal function prior to the examination. Patient encouraged to monitor and to let us know if anything changes. Osteoarthritispatient status post right TKA at [facility]. Patient is doing well currently. BPH/LUTSpatient doing well on Flomax, denies any current concerns. History of hyperglycemiapatient to get A1c with fasting labs    Dysthymiapatient denies and declines any further intervention at this time. Obesitypatient encouraged to follow a low-salt diet as well as a low fat diet. We will pursue weight loss and continue to discuss at each visit.   Return in about 2 weeks (around 2/24/2021) for bp recheck. Orders Placed This Encounter   Procedures    CT ABDOMEN PELVIS W IV CONTRAST Additional Contrast? Oral     Standing Status:   Future     Standing Expiration Date:   2/10/2022     Order Specific Question:   Additional Contrast?     Answer:   Oral     Order Specific Question:   Reason for exam:     Answer:   right lower quadrant mass, discomfort x 3-4 months    PREVNAR 13 IM (Pneumococcal conjugate vaccine 13-valent)    Lipid, Fasting     Standing Status:   Future     Standing Expiration Date:   2/10/2022    Comprehensive Metabolic Panel, Fasting     Standing Status:   Future     Standing Expiration Date:   2/10/2022    TSH With Reflex Ft4     Standing Status:   Future     Standing Expiration Date:   2/10/2022    CBC Auto Differential     Standing Status:   Future     Standing Expiration Date:   2/10/2022    Hemoglobin A1C     Standing Status:   Future     Standing Expiration Date:   2/10/2022     Orders Placed This Encounter   Medications    CELEBREX 200 MG capsule     Sig: Take 1 capsule by mouth 2 times daily     Dispense:  180 capsule     Refill:  1    tamsulosin (FLOMAX) 0.4 MG capsule     Sig: Take 1 capsule by mouth daily     Dispense:  90 capsule     Refill:  0       Patient given educational materials - see patient instructions. Discussed use, benefit, and side effects of prescribed medications. All patientquestions answered. Pt voiced understanding. Reviewed health maintenance. Instructedto continue current medications, diet and exercise. Patient agreed with treatmentplan. Follow up as directed.      Electronically signed by Wendy May MD on 2/10/2021 at 12:31 PM

## 2021-02-12 ENCOUNTER — TELEPHONE (OUTPATIENT)
Dept: FAMILY MEDICINE CLINIC | Age: 66
End: 2021-02-12

## 2021-02-12 NOTE — TELEPHONE ENCOUNTER
Faxed request from [address] [place] [address] drug is not covered under pt ins for celebrex. Please advise thank you!         Next Visit Date:  Future Appointments   Date Time Provider Sachin Caceresi   2/16/2021 10:00 AM [facility] [place] [facility]   2/25/2021 11:00 [facility] [address] Maintenance   Topic Date Due    COVID-19 Vaccine (1 of 2) 03/24/1971    Shingles Vaccine (1 of 2) 03/24/2005    Flu vaccine (1) 02/10/2022 (Originally 9/1/2020)    Pneumococcal 65+ years Vaccine (2 of 2 - PPSV23) 02/10/2022    Colon cancer screen colonoscopy  01/06/2023    Lipid screen  11/19/2024    DTaP/Tdap/Td vaccine (2 - Td) 06/14/2027    Hepatitis C screen  Completed    HIV screen  Completed    Hepatitis A vaccine  Aged Out    Hepatitis B vaccine  Aged Out    Hib vaccine  Aged Out    Meningococcal (ACWY) vaccine  Aged Out       No results found for: LABA1C          ( goal A1C is < 7)   No results found for: LABMICR  LDL Cholesterol (mg/dL)   Date Value   11/19/2019 109     LDL Calculated (mg/dL)   Date Value   04/18/2017 122   06/07/2016 156       (goal LDL is <100)   AST (U/L)   Date Value   06/12/2020 11     ALT (U/L)   Date Value   06/12/2020 11     BUN (mg/dL)   Date Value   06/12/2020 17     BP Readings from Last 3 Encounters:   02/10/21 (!) 152/72   02/24/20 (!) 147/68   01/06/20 135/71          (goal 120/80)    All Future Testing planned in [facility]  Lab Frequency Next Occurrence   COVID-19 Ambulatory Once 06/11/2020   Lipid, Fasting Once 02/10/2021   Comprehensive Metabolic Panel, Fasting Once 02/10/2021   TSH With Reflex Ft4 Once 02/10/2021   CBC Auto Differential Once 02/10/2021   Hemoglobin A1C Once 02/10/2021   CT ABDOMEN PELVIS W IV CONTRAST Additional Contrast? Oral Once 02/10/2021               Patient Active Problem List:     Unilateral primary osteoarthritis, right knee     Depression     Elevated BP without diagnosis of hypertension     Benign prostatic hyperplasia without urinary obstruction     Lower urinary tract symptoms due to benign prostatic hyperplasia     Urge urinary incontinence         ]

## 2021-02-15 DIAGNOSIS — M15.9 PRIMARY OSTEOARTHRITIS INVOLVING MULTIPLE JOINTS: ICD-10-CM

## 2021-02-19 ENCOUNTER — HOSPITAL ENCOUNTER (OUTPATIENT)
Dept: CT IMAGING | Facility: CLINIC | Age: 66
Discharge: HOME | End: 2021-02-21
Payer: MEDICARE

## 2021-02-19 DIAGNOSIS — R19.03 RIGHT LOWER QUADRANT ABDOMINAL MASS: ICD-10-CM

## 2021-02-19 LAB
CREAT SERPL-MCNC: 1 MG/DL (ref 0.7–1.2)
GFR AFRICAN AMERICAN: >60 ML/MIN
GFR NON-AFRICAN AMERICAN: >60 ML/MIN
GFR SERPL CREATININE-BSD FRML MDRD: NORMAL ML/MIN/{1.73_M2}
GFR SERPL CREATININE-BSD FRML MDRD: NORMAL ML/MIN/{1.73_M2}

## 2021-02-19 PROCEDURE — 36415 COLL VENOUS BLD VENIPUNCTURE: CPT

## 2021-02-19 PROCEDURE — 82565 ASSAY OF CREATININE: CPT

## 2021-02-19 PROCEDURE — 74177 CT ABD & PELVIS W/CONTRAST: CPT

## 2021-02-19 PROCEDURE — 2580000003 HC RX 258: Performed by: FAMILY MEDICINE

## 2021-02-19 PROCEDURE — 6360000004 HC RX CONTRAST MEDICATION: Performed by: FAMILY MEDICINE

## 2021-02-19 RX ORDER — 0.9 % SODIUM CHLORIDE 0.9 %
70 INTRAVENOUS SOLUTION INTRAVENOUS ONCE
Status: COMPLETED | OUTPATIENT
Start: 2021-02-19 | End: 2021-02-19

## 2021-02-19 RX ORDER — SODIUM CHLORIDE 0.9 % (FLUSH) 0.9 %
10 SYRINGE (ML) INJECTION PRN
Status: DISCONTINUED | OUTPATIENT
Start: 2021-02-19 | End: 2021-02-22 | Stop reason: HOSPADM

## 2021-02-19 RX ADMIN — SODIUM CHLORIDE 70 ML: 9 INJECTION, SOLUTION INTRAVENOUS at 10:49

## 2021-02-19 RX ADMIN — IOPAMIDOL 75 ML: 755 INJECTION, SOLUTION INTRAVENOUS at 10:48

## 2021-02-19 RX ADMIN — IOHEXOL 50 ML: 240 INJECTION, SOLUTION INTRATHECAL; INTRAVASCULAR; INTRAVENOUS; ORAL at 10:49

## 2021-02-19 RX ADMIN — SODIUM CHLORIDE, PRESERVATIVE FREE 10 ML: 5 INJECTION INTRAVENOUS at 10:48

## 2021-02-25 ENCOUNTER — OFFICE VISIT (OUTPATIENT)
Dept: FAMILY MEDICINE CLINIC | Age: 66
End: 2021-02-25
Payer: MEDICARE

## 2021-02-25 VITALS
TEMPERATURE: 97 F | OXYGEN SATURATION: 98 % | HEART RATE: 83 BPM | WEIGHT: 231.8 LBS | DIASTOLIC BLOOD PRESSURE: 70 MMHG | HEIGHT: 71 IN | BODY MASS INDEX: 32.45 KG/M2 | SYSTOLIC BLOOD PRESSURE: 138 MMHG

## 2021-02-25 DIAGNOSIS — N20.0 KIDNEY STONE: Chronic | ICD-10-CM

## 2021-02-25 DIAGNOSIS — I10 ESSENTIAL (PRIMARY) HYPERTENSION: Primary | ICD-10-CM

## 2021-02-25 PROCEDURE — 99213 OFFICE O/P EST LOW 20 MIN: CPT | Performed by: FAMILY MEDICINE

## 2021-02-25 RX ORDER — BLOOD PRESSURE TEST KIT
1 KIT MISCELLANEOUS DAILY
Qty: 1 KIT | Refills: 0 | Status: SHIPPED | OUTPATIENT
Start: 2021-02-25

## 2021-02-25 NOTE — PROGRESS NOTES
[address] PRIMARY CARE  [address] [place] [address]  Dept: [phone]  Dept Fax: [phone]    Shaquille Floyd is a 72 y.o. male who is a Established patient, who presents today for his medical conditions/complaints as noted below:  Chief Complaint   Patient presents with    2 Week Follow-Up    Blood Pressure Check         HPI:     The patient is a pleasant 35-year-old male who presents for a 2-week recheck of his blood pressure. At the last visit his blood pressure was elevated and had been the last few visits before that. The patient did not want to start any medication and preferred to try to cut back on his salt. He also noted at the last visit that he was under significant stress as his dog was very sick and needed surgery. The last visit he had also mentioned a right lower quadrant mass/pain. A CT scan was done which did reveal a right kidney stone that was 7 mm sitting in the pelvis of the kidney. There was no sign of hydronephrosis or infection at that time. The patient has seen urology in the past for kidney stones. He is also seen them for BPH. He has not returned in a while. He is not doing any specific diet to help with his history of kidney stones.       No results found for: LABA1C          ( goal A1Cis < 7)   No results found for: LABMICR  LDL Cholesterol (mg/dL)   Date Value   11/19/2019 109     LDL Calculated (mg/dL)   Date Value   04/18/2017 122   06/07/2016 156       (goal LDL is <100)   AST (U/L)   Date Value   06/12/2020 11     ALT (U/L)   Date Value   06/12/2020 11     BUN (mg/dL)   Date Value   06/12/2020 17     BP Readings from Last 3 Encounters:   02/25/21 138/70   02/10/21 (!) 152/72   02/24/20 (!) 147/68          (goal 120/80)    Past Medical History:   Diagnosis Date    Chronic pain of right knee 12/5/2019    Depression     Knee pain     Night sweats     Osteoarthritis     Chronic    Weight loss       Past Surgical History:   Procedure Laterality Date    CARPAL TUNNEL RELEASE Bilateral     1990's    COLONOSCOPY N/A 1/6/2020    COLONOSCOPY WITH BIOPSY performed by Maicol Ortiz MD at [address] ARTHROSCOPY Right     multiple times    ROTATOR CUFF REPAIR Left        Family History   Problem Relation Age of Onset    No Known Problems Mother     Cancer Father     Diabetes type 2  Father     COPD Brother     No Known Problems Maternal Grandmother     No Known Problems Maternal Grandfather     No Known Problems Paternal Grandmother     No Known Problems Paternal Grandfather     Obesity Maternal Cousin     Heart Attack Brother        Social History     Tobacco Use    Smoking status: Never Smoker    Smokeless tobacco: Never Used   Substance Use Topics    Alcohol use: Not Currently     Alcohol/week: 0.0 standard drinks      Current Outpatient Medications   Medication Sig Dispense Refill    Blood Pressure KIT 1 Units by Does not apply route daily 1 kit 0    CELEBREX 200 MG capsule Take 1 capsule by mouth 2 times daily 180 capsule 1    tamsulosin (FLOMAX) 0.4 MG capsule Take 1 capsule by mouth daily 90 capsule 0     No current facility-administered medications for this visit.       Allergies   Allergen Reactions    Codeine      Uncomfortable feeling- sea sick feeling    Hydrocodone Nausea Only    No Known Allergies        Health Maintenance   Topic Date Due    Annual Wellness Visit (AWV)  02/19/2021    COVID-19 Vaccine (1 of 2) 07/13/2021 (Originally 3/24/1971)    Flu vaccine (1) 02/10/2022 (Originally 9/1/2020)    Shingles Vaccine (1 of 2) 02/25/2022 (Originally 3/24/2005)    Pneumococcal 65+ years Vaccine (2 of 2 - PPSV23) 02/10/2022    Colon cancer screen colonoscopy  01/06/2023    Lipid screen  11/19/2024    DTaP/Tdap/Td vaccine (2 - Td) 06/14/2027    Hepatitis C screen  Completed    HIV screen  Completed    Hepatitis A vaccine  Aged Out    Hepatitis B vaccine  Aged Out    Hib vaccine  Aged 83   Temp 97 °F (36.1 °C)   Ht 5' 11\" (1.803 m)   Wt 231 lb 12.8 oz (105.1 kg)   SpO2 98%   BMI 32.33 kg/m²     Assessment:       Diagnosis Orders   1. Essential (primary) hypertension   Blood Pressure KIT   2. Kidney stone               Plan:    hyperetensionpatient was strongly encouraged to get a blood pressure cuff. A prescription was written out for him. I would like him to check his blood pressure every day for the next month. I want him to check it at random times including in the morning, afternoon and night and record the readings for me. Patient also encouraged to follow a DASH diet information was printed out for him about this. Kidney stonepatient encouraged to follow-up with urology. The kidney stone is 7 mm at this point. Advised him also to pursue dietary changes to prevent recurrence. I did tell him we could potentially consider hydrochlorothiazide which does decrease the risk of kidney stones that would treat his blood pressure 2. For the time being he would like to go see urology and get their opinion. Return in about 8 weeks (around 2021) for bp recheck. No orders of the defined types were placed in this encounter. Orders Placed This Encounter   Medications    Blood Pressure KIT     Si Units by Does not apply route daily     Dispense:  1 kit     Refill:  0       Patient given educational materials - see patient instructions. Discussed use, benefit, and side effects of prescribed medications. All patientquestions answered. Pt voiced understanding. Reviewed health maintenance. Instructedto continue current medications, diet and exercise. Patient agreed with treatmentplan. Follow up as directed.      Electronically signed by Vargas Barth MD on 2021 at 3:11 PM

## 2021-02-25 NOTE — PATIENT INSTRUCTIONS
Patient Education        Learning About Diet for Kidney Stone Prevention  What are kidney stones? Kidney stones are small \"braden\" that form in your kidneys. They're made of salts and minerals in the urine. Stones may not cause a problem as long as they stay in the kidneys. But they can cause sudden, severe pain. Pain is most likely when the stones travel through the ureters (the tubes that carry urine from the kidneys to the bladder). Kidney stones can cause bloody urine. Kidney stones often run in families. You are more likely to get them if you don't drink enough fluids, mainly water. Certain foods and drinks and some dietary supplements may also increase your risk for kidney stones if you consume too much of them. What can you do to prevent kidney stones? Changing what you eat may not prevent all types of kidney stones. But for people who have a history of certain kinds of kidney stones, some changes in diet may help. A dietitian can help you set up a meal plan that includes healthy, low-oxalate choices. Here are some general guidelines to get you started. Plan your meals and snacks around foods that are low in oxalate. These foods include:  · Corn, kale, parsnips, and squash,. · Beef, chicken, pork, turkey, and fish. · Milk, butter, cheese, and yogurt. You can eat certain foods that are medium-high in oxalate, but eat them only once in a while. These foods include:  · Bread. · Brown rice. · English muffins. · Figs. · Popcorn. · String beans. · Tomatoes. Limit very high-oxalate foods, including:  · Black tea. · Coffee. · Chocolate. · Dark green vegetables. · Nuts. Here are some other things you can do to help prevent kidney stones. · Drink plenty of fluids. If you have kidney, heart, or liver disease and have to limit fluids, talk with your doctor before you increase the amount of fluids you drink.   · Do not take more than the recommended daily dose of vitamins C and D. · Limit the salt in your diet. · Eat a balanced diet that is not too high in protein. Follow-up care is a key part of your treatment and safety. Be sure to make and go to all appointments, and call your doctor if you are having problems. It's also a good idea to know your test results and keep a list of the medicines you take. Where can you learn more? Go to https://Apnex Medicalpetxtr.Ubiregi. org and sign in to your [employer] account. Enter C138 in the [employer] box to learn more about \"Learning About Diet for Kidney Stone Prevention. \"     If you do not have an account, please click on the \"Sign Up Now\" link. Current as of: August 22, 2019               Content Version: 12.6  © 5301-1176 [employer]. Care instructions adapted under license by [facility] ([facility]). If you have questions about a medical condition or this instruction, always ask your healthcare professional. [place] [address] any warranty or liability for your use of this information. Patient Education        DASH Diet: Care Instructions  Your Care Instructions     The DASH diet is an eating plan that can help lower your blood pressure. DASH stands for Dietary Approaches to Stop Hypertension. Hypertension is high blood pressure. The DASH diet focuses on eating foods that are high in calcium, potassium, and magnesium. These nutrients can lower blood pressure. The foods that are highest in these nutrients are fruits, vegetables, low-fat dairy products, nuts, seeds, and legumes. But taking calcium, potassium, and magnesium supplements instead of eating foods that are high in those nutrients does not have the same effect. The DASH diet also includes whole grains, fish, and poultry. The DASH diet is one of several lifestyle changes your doctor may recommend to lower your high blood pressure. Your doctor may also want you to decrease the amount of sodium in your diet. Lowering sodium while following the DASH diet can lower blood pressure even further than just the DASH diet alone. Follow-up care is a key part of your treatment and safety. Be sure to make and go to all appointments, and call your doctor if you are having problems. It's also a good idea to know your test results and keep a list of the medicines you take. How can you care for yourself at home? Following the DASH diet  · Eat 4 to 5 servings of fruit each day. A serving is 1 medium-sized piece of fruit, ½ cup chopped or canned fruit, 1/4 cup dried fruit, or 4 ounces (½ cup) of fruit juice. Choose fruit more often than fruit juice. · Eat 4 to 5 servings of vegetables each day. A serving is 1 cup of lettuce or raw leafy vegetables, ½ cup of chopped or cooked vegetables, or 4 ounces (½ cup) of vegetable juice. Choose vegetables more often than vegetable juice. · Get 2 to 3 servings of low-fat and fat-free dairy each day. A serving is 8 ounces of milk, 1 cup of yogurt, or 1 ½ ounces of cheese. · Eat 6 to 8 servings of grains each day. A serving is 1 slice of bread, 1 ounce of dry cereal, or ½ cup of cooked rice, pasta, or cooked cereal. Try to choose whole-grain products as much as possible. · Limit lean meat, poultry, and fish to 2 servings each day. A serving is 3 ounces, about the size of a deck of cards. · Eat 4 to 5 servings of nuts, seeds, and legumes (cooked dried beans, lentils, and split peas) each week. A serving is 1/3 cup of nuts, 2 tablespoons of seeds, or ½ cup of cooked beans or peas. · Limit fats and oils to 2 to 3 servings each day. A serving is 1 teaspoon of vegetable oil or 2 tablespoons of salad dressing. Care instructions adapted under license by [facility] [facility] ([facility]). If you have questions about a medical condition or this instruction, always ask your healthcare professional. [address] any warranty or liability for your use of this information. Patient Education         High Blood Pressure: The DASH Diet (02:03)  Your health professional recommends that you watch this short online health video. Learn how the DASH eating plan can help lower your blood pressure. How to watch the video    Scan the QR code   OR Visit the website    https://VitaFlavori. se/r/Epae8djcexhap   Current as of: December 16, 2019               Content Version: 12.6  © 8591-4000 [employer]. Care instructions adapted under license by [employer] [facility] ([facility]). If you have questions about a medical condition or this instruction, always ask your healthcare professional. [address] any warranty or liability for your use of this information.      Follow up with urology as discussed due to 7mm stone on CT scan

## 2021-02-26 PROBLEM — N20.0 KIDNEY STONE: Chronic | Status: ACTIVE | Noted: 2021-02-26

## 2021-02-26 ASSESSMENT — ENCOUNTER SYMPTOMS
GASTROINTESTINAL NEGATIVE: 1
EYES NEGATIVE: 1
RESPIRATORY NEGATIVE: 1

## 2021-03-01 ENCOUNTER — TELEPHONE (OUTPATIENT)
Dept: FAMILY MEDICINE CLINIC | Age: 66
End: 2021-03-01

## 2021-03-01 NOTE — TELEPHONE ENCOUNTER
Pt states he is needing an appeal for his celebrex. Contacted pharmacy and they stated it is not covered. Please advise thank you! Please reach out to Formulary expception/ appeal number at    [phone]          Next Visit Date:  No future appointments.     Health Maintenance   Topic Date Due    Annual Wellness Visit (AWV)  02/19/2021    COVID-19 Vaccine (1 of 2) 07/13/2021 (Originally 3/24/1971)    Flu vaccine (1) 02/10/2022 (Originally 9/1/2020)    Shingles Vaccine (1 of 2) 02/25/2022 (Originally 3/24/2005)    Pneumococcal 65+ years Vaccine (2 of 2 - PPSV23) 02/10/2022    Colon cancer screen colonoscopy  01/06/2023    Lipid screen  11/19/2024    DTaP/Tdap/Td vaccine (2 - Td) 06/14/2027    Hepatitis C screen  Completed    HIV screen  Completed    Hepatitis A vaccine  Aged Out    Hepatitis B vaccine  Aged Out    Hib vaccine  Aged Out    Meningococcal (ACWY) vaccine  Aged Out       No results found for: LABA1C          ( goal A1C is < 7)   No results found for: LABMICR  LDL Cholesterol (mg/dL)   Date Value   11/19/2019 109     LDL Calculated (mg/dL)   Date Value   04/18/2017 122   06/07/2016 156       (goal LDL is <100)   AST (U/L)   Date Value   06/12/2020 11     ALT (U/L)   Date Value   06/12/2020 11     BUN (mg/dL)   Date Value   06/12/2020 17     BP Readings from Last 3 Encounters:   02/25/21 138/70   02/10/21 (!) 152/72   02/24/20 (!) 147/68          (goal 120/80)    All Future Testing planned in [facility]  Lab Frequency Next Occurrence   COVID-19 Ambulatory Once 06/11/2020   Lipid, Fasting Once 02/10/2021   Comprehensive Metabolic Panel, Fasting Once 02/10/2021   TSH With Reflex Ft4 Once 02/10/2021   CBC Auto Differential Once 02/10/2021   Hemoglobin A1C Once 02/10/2021               Patient Active Problem List:     Unilateral primary osteoarthritis, right knee     Depression     Elevated BP without diagnosis of hypertension     Benign prostatic hyperplasia without urinary obstruction Lower urinary tract symptoms due to benign prostatic hyperplasia     Urge urinary incontinence     Kidney stone

## 2021-03-01 NOTE — LETTER
Letter of Medical Necessity    3/22/2021        Insurance Company: [employer]      RE: Eva Church  [phone] [facility] [place] [address]      : 1955      Pipo Alfaro and Rabia    This letter is being provided to support the medical necessity of Nicole Baptiste's Celebrex. Karan Becerra has the diagnosis of unilateral primary osteoarthritis right knee and has failed the following conservative treatment: otc analgesic including tylenol and ibuprofen, physical therapy, stretching, orthopedic consultation. Should you have any questions or concerns, please feel free to contact my office at [phone].       Sincerely      [address]

## 2021-03-08 ENCOUNTER — TELEPHONE (OUTPATIENT)
Dept: FAMILY MEDICINE CLINIC | Age: 66
End: 2021-03-08

## 2021-03-08 NOTE — TELEPHONE ENCOUNTER
The right eye is a little crusty and the bottom lid is red on the inside and when he pushes on it is it tender and it is swollen and he was wondering if there is something he could get otc he does not think its pink eye please advise

## 2021-03-29 ENCOUNTER — TELEPHONE (OUTPATIENT)
Dept: FAMILY MEDICINE CLINIC | Age: 66
End: 2021-03-29

## 2021-03-29 DIAGNOSIS — M15.9 PRIMARY OSTEOARTHRITIS INVOLVING MULTIPLE JOINTS: ICD-10-CM

## 2021-03-29 RX ORDER — CELECOXIB 200 MG/1
200 CAPSULE ORAL 2 TIMES DAILY
Qty: 180 CAPSULE | Refills: 1 | Status: SHIPPED | OUTPATIENT
Start: 2021-03-29 | End: 2021-09-22

## 2021-03-29 NOTE — TELEPHONE ENCOUNTER
[facility] for the patient to cb  I called his insurance company and they will not cover the name brand celobrex it is not on formulary however it looks like the generic is covered and approved

## 2021-05-08 DIAGNOSIS — N40.1 LOWER URINARY TRACT SYMPTOMS DUE TO BENIGN PROSTATIC HYPERPLASIA: ICD-10-CM

## 2021-05-08 RX ORDER — TAMSULOSIN HYDROCHLORIDE 0.4 MG/1
CAPSULE ORAL
Qty: 90 CAPSULE | Refills: 0 | Status: ON HOLD | OUTPATIENT
Start: 2021-05-08 | End: 2021-08-26 | Stop reason: SDUPTHER

## 2021-08-04 ENCOUNTER — OFFICE VISIT (OUTPATIENT)
Dept: FAMILY MEDICINE CLINIC | Age: 66
End: 2021-08-04
Payer: MEDICARE

## 2021-08-04 ENCOUNTER — HOSPITAL ENCOUNTER (OUTPATIENT)
Age: 66
Setting detail: SPECIMEN
Discharge: HOME | End: 2021-08-04
Payer: MEDICARE

## 2021-08-04 VITALS
WEIGHT: 232 LBS | TEMPERATURE: 97 F | BODY MASS INDEX: 32.48 KG/M2 | HEART RATE: 63 BPM | HEIGHT: 71 IN | SYSTOLIC BLOOD PRESSURE: 138 MMHG | DIASTOLIC BLOOD PRESSURE: 50 MMHG

## 2021-08-04 DIAGNOSIS — N20.0 NEPHROLITHIASIS: ICD-10-CM

## 2021-08-04 DIAGNOSIS — K57.90 DIVERTICULOSIS: ICD-10-CM

## 2021-08-04 DIAGNOSIS — Z01.811 PRE-OP CHEST EXAM: Primary | ICD-10-CM

## 2021-08-04 DIAGNOSIS — R03.0 ELEVATED BP WITHOUT DIAGNOSIS OF HYPERTENSION: ICD-10-CM

## 2021-08-04 DIAGNOSIS — Z01.811 PRE-OP CHEST EXAM: ICD-10-CM

## 2021-08-04 PROCEDURE — 99214 OFFICE O/P EST MOD 30 MIN: CPT | Performed by: FAMILY MEDICINE

## 2021-08-04 PROCEDURE — 93000 ELECTROCARDIOGRAM COMPLETE: CPT | Performed by: FAMILY MEDICINE

## 2021-08-04 ASSESSMENT — ENCOUNTER SYMPTOMS
BACK PAIN: 1
GASTROINTESTINAL NEGATIVE: 1
RESPIRATORY NEGATIVE: 1
EYES NEGATIVE: 1

## 2021-08-04 NOTE — PATIENT INSTRUCTIONS
Patient Education        hydrochlorothiazide  Pronunciation:  SARAH dupree zide  What is the most important information I should know about hydrochlorothiazide? You should not use this medicine if you are unable to urinate. What is hydrochlorothiazide? Hydrochlorothiazide is a thiazide diuretic (water pill) that is used to treat high blood pressure (hypertension). Hydrochlorothiazide may also be used for purposes not listed in this medication guide. What should I discuss with my healthcare provider before taking hydrochlorothiazide? You should not use hydrochlorothiazide if you are allergic to it, or if you are unable to urinate. Tell your doctor if you have ever had:  · kidney disease;  · liver disease;  · gout;  · glaucoma;  · low levels of potassium or sodium in your blood;  · high levels of calcium in your blood;  · a parathyroid gland disorder;  · diabetes; or  · an allergy to sulfa drugs or penicillin. Tell your doctor if you are pregnant or plan to become pregnant. If you take this medicine during pregnancy, your  baby may develop jaundice or other problems. You should not breastfeed while using this medicine. Hydrochlorothiazide is not approved for use by anyone younger than 25years old. How should I take hydrochlorothiazide? Follow all directions on your prescription label and read all medication guides or instruction sheets. Your doctor may occasionally change your dose. Use the medicine exactly as directed. Call your doctor if you have ongoing vomiting or diarrhea, or if you are sweating more than usual. You can easily become dehydrated while taking this medicine, which can lead to severely low blood pressure or a serious electrolyte imbalance. Your blood pressure will need to be checked often. Your blood and urine may be tested if you have been vomiting or are dehydrated. If you need surgery, tell your surgeon you currently use this medicine.  You may need to stop for a short time. Keep using this medicine as directed, even if you feel well. High blood pressure often has no symptoms. You may need to use blood pressure medicine for the rest of your life. Store at room temperature away from moisture, heat, and freezing. Keep the bottle tightly closed when not in use. What happens if I miss a dose? Take the medicine as soon as you can, but skip the missed dose if it is almost time for your next dose. Do not take two doses at one time. What happens if I overdose? Seek emergency medical attention or call the Poison Help line at 1-680.899.7533. Overdose symptoms may include nausea, dizziness, dry mouth, thirst, and muscle pain or weakness. What should I avoid while taking hydrochlorothiazide? Hydrochlorothiazide may increase your risk of skin cancer. Avoid sunlight or tanning beds. Wear protective clothing and use sunscreen (SPF 30 or higher) when you are outdoors. Your doctor may want you to have skin examinations on a regular basis. Drinking alcohol with this medicine can cause side effects. Avoid getting up too fast from a sitting or lying position, or you may feel dizzy. Avoid becoming overheated or dehydrated during exercise, in hot weather, or by not drinking enough fluids. Follow your doctor's instructions about the type and amount of liquids you should drink. In some cases, drinking too much liquid can be as unsafe as not drinking enough. What are the possible side effects of hydrochlorothiazide? Get emergency medical help if you have signs of an allergic reaction (hives, difficult breathing, swelling in your face or throat) or a severe skin reaction (fever, sore throat, burning eyes, skin pain, red or purple skin rash with blistering and peeling).   Call your doctor at once if you have:  · a light-headed feeling;  · eye pain, vision problems;  · jaundice (yellowing of the skin or eyes);  · pale skin, easy bruising, unusual bleeding (nose, mouth, vagina, or

## 2021-08-04 NOTE — PROGRESS NOTES
[address] [facility]IMARY [facility]  [address] [place] [address]  Dept: [phone]  Dept Fax: [phone]    Marlys Eason is a 77 y.o. male who is a Established patient, who presents today for his medical conditions/complaints as noted below:  Chief Complaint   Patient presents with    Pre-op Exam     urine culture and ekg xray          HPI:     The patient is a 77year old male with a past medical history significant for kidney stone on the right side (7mm on CT scan 2/2021), elevated blood pressure readings who presents today for preop clearance for nephrolithiasis to be done on 18 August by Dr. Taran Bertrand. The patient's kidney stone was found back in February as mentioned before. He has some right-sided discomfort which led him to see the urologist.    The patient's blood pressure remains slightly elevated. The patient does not want to go on medications and is vehement that he would like to avoid extra measures if possible. He has changed his diet and has tried to avoid salt although on occasion he does have salt intake. The patient denies any personal history of anesthesia reactions other than some post operative nausea in the past.  The patient denies any family history of anesthesia problems. The patient is able to walk up stairs without sob, chest pain.         No results found for: LABA1C          ( goal A1Cis < 7)   No results found for: LABMICR  LDL Cholesterol (mg/dL)   Date Value   11/19/2019 109     LDL Calculated (mg/dL)   Date Value   04/18/2017 122   06/07/2016 156       (goal LDL is <100)   AST (U/L)   Date Value   06/12/2020 11     ALT (U/L)   Date Value   06/12/2020 11     BUN (mg/dL)   Date Value   06/12/2020 17     BP Readings from Last 3 Encounters:   08/04/21 (!) 138/50   02/25/21 138/70   02/10/21 (!) 152/72          (goal 120/80)    Past Medical History:   Diagnosis Date    Chronic pain of right knee 12/5/2019    Depression     Knee pain     Night sweats     Osteoarthritis     Chronic    Weight loss       Past Surgical History:   Procedure Laterality Date    CARPAL TUNNEL RELEASE Bilateral     1990's    COLONOSCOPY N/A 1/6/2020    COLONOSCOPY WITH BIOPSY performed by Ruth Kevin MD at [facility] ARTHROSCOPY Right     multiple times    ROTATOR CUFF REPAIR Left        Family History   Problem Relation Age of Onset    No Known Problems Mother     Cancer Father     Diabetes type 2  Father     COPD Brother     No Known Problems Maternal Grandmother     No Known Problems Maternal Grandfather     No Known Problems Paternal Grandmother     No Known Problems Paternal Grandfather     Obesity Maternal Cousin     Heart Attack Brother        Social History     Tobacco Use    Smoking status: Never Smoker    Smokeless tobacco: Never Used   Substance Use Topics    Alcohol use: Not Currently     Alcohol/week: 0.0 standard drinks      Current Outpatient Medications   Medication Sig Dispense Refill    tamsulosin (FLOMAX) 0.4 MG capsule TAKE 1 CAPSULE BY MOUTH EVERY DAY 90 capsule 0    celecoxib (CELEBREX) 200 MG capsule Take 1 capsule by mouth 2 times daily 180 capsule 1    Blood Pressure KIT 1 Units by Does not apply route daily 1 kit 0     No current facility-administered medications for this visit.      Allergies   Allergen Reactions    Codeine      Uncomfortable feeling- sea sick feeling    Hydrocodone Nausea Only       Health Maintenance   Topic Date Due    COVID-19 Vaccine (1) Never done    Annual Wellness Visit (AWV)  Never done    Shingles Vaccine (1 of 2) 02/25/2022 (Originally 3/24/2005)    Flu vaccine (1) 09/01/2021    Pneumococcal 65+ years Vaccine (2 of 2 - PPSV23) 02/10/2022    Colon cancer screen colonoscopy  01/06/2023    Lipid screen  11/19/2024    DTaP/Tdap/Td vaccine (2 - Td or Tdap) 06/14/2027    Hepatitis C screen  Completed    Hepatitis A vaccine  Aged Out    Hepatitis B vaccine  Aged Out    Hib vaccine Aged Out    Meningococcal (ACWY) vaccine  Aged Out       Subjective:     Review of Systems   Constitutional: Negative. HENT: Negative. Eyes: Negative. Respiratory: Negative. Cardiovascular: Negative. Gastrointestinal: Negative. Genitourinary: Negative. Musculoskeletal: Positive for back pain (has chronic back pain (left lower that radiates into his buttock) and some that goes between his SI joint area). Skin: Negative. Allergic/Immunologic: Negative for environmental allergies, food allergies and immunocompromised state. Neurological: Negative. Psychiatric/Behavioral: Negative. Objective:     Physical Exam  Vitals and nursing note reviewed. Constitutional:       General: He is awake. He is not in acute distress. Appearance: Normal appearance. He is obese. He is not ill-appearing, toxic-appearing or diaphoretic. HENT:      Head: Normocephalic and atraumatic. Right Ear: External ear normal.      Left Ear: External ear normal.      Nose: Nose normal.      Mouth/Throat:      Mouth: Mucous membranes are moist.   Eyes:      Extraocular Movements: Extraocular movements intact. Conjunctiva/sclera: Conjunctivae normal.      Pupils: Pupils are equal, round, and reactive to light. Cardiovascular:      Rate and Rhythm: Normal rate and regular rhythm. Pulses: Normal pulses. Heart sounds: Normal heart sounds. No murmur heard. No friction rub. No gallop. Pulmonary:      Effort: Pulmonary effort is normal. No respiratory distress. Breath sounds: Normal breath sounds. No stridor. No wheezing, rhonchi or rales. Abdominal:      General: Bowel sounds are normal.      Palpations: Abdomen is soft. Musculoskeletal:         General: Normal range of motion. Cervical back: Normal range of motion. Neurological:      General: No focal deficit present. Mental Status: He is alert and oriented to person, place, and time. Mental status is at baseline. Cranial Nerves: No cranial nerve deficit. Motor: No weakness. Gait: Gait normal.   Psychiatric:         Attention and Perception: Attention normal.         Mood and Affect: Mood and affect normal.         Speech: Speech normal.         Behavior: Behavior normal.         Thought Content: Thought content normal.         Judgment: Judgment normal.       BP (!) 138/50 (Site: Left Upper Arm, Position: Sitting, Cuff Size: Large Adult)   Pulse 63   Temp 97 °F (36.1 °C) (Temporal)   Ht 5' 11\" (1.803 m)   Wt 232 lb (105.2 kg)   BMI 32.36 kg/m²     Assessment:       Diagnosis Orders   1. Pre-op chest exam  EKG 12 Lead    EKG 12 Lead    Culture, Urine   2. Nephrolithiasis  Culture, Urine   3. Diverticulosis     4. Elevated BP without diagnosis of hypertension               Plan:    Preop examinationEKG today is normal.  Advised patient of importance of blood pressure control. At this time he continues to state he would like to avoid any medications that are extra or daily. Advised him of the potential consequences of elevated blood pressure. Blood pressure did come down on recheck. We will continue to discuss each visit. Urine culture ordered per urology request.  Patient will be cleared for surgery and considered at low risk. We will write a letter to Dr. Juan Erwin regarding this. Nephrolithiasislithotripsy planned    Diverticulosispatient mention that he was told this in the past.  Advised him to follow high-fiber diet. Elevated blood pressureAdvised patient I would like to start medication and discussed the closer to 120/80 bp is the lower the risk of CVD and stroke. He is vehement he would like to avoid medication at this time. Will have him come back in 1 month.   Return in about 1 month (around 9/4/2021) for awv--virtual.    Orders Placed This Encounter   Procedures    Culture, Urine     Standing Status:   Future     Standing Expiration Date:   8/4/2022     Order Specific Question: Specify (ex-cath, midstream, cysto, etc)? Answer:   mid stream    EKG 12 Lead     Standing Status:   Future     Number of Occurrences:   1     Standing Expiration Date:   8/4/2022     Order Specific Question:   Reason for Exam?     Answer:   Pre-op     No orders of the defined types were placed in this encounter. Patient given educational materials - see patient instructions. Discussed use, benefit, and side effects of prescribed medications. All patientquestions answered. Pt voiced understanding. Reviewed health maintenance. Instructedto continue current medications, diet and exercise. Patient agreed with treatmentplan. Follow up as directed.      Electronically signed by Tomás Caldwell MD on 8/4/2021 at 10:52 AM

## 2021-08-04 NOTE — LETTER
[facility] [address] Nolberto García  [phone]  [phone]      Eleni Martinez MD        August 4, 2021    Elly Clark    Dear Dr. Gisselle Monroe:          Elly Clark was evaluated in my office for pre-operative on August 4, 2021. Based on review of information available to me at this time, the patient appears to be at no increased risk for the planned procedure. EKG will be faxed to your office as well. If you have any questions or concerns, please don't hesitate to call.     Sincerely,      Eleni Martinez MD

## 2021-08-05 LAB
-: NORMAL
REASON FOR REJECTION: NORMAL
ZZ NTE CLEAN UP: ORDERED TEST: NORMAL
ZZ NTE WITH NAME CLEAN UP: SPECIMEN SOURCE: NORMAL

## 2021-08-06 LAB
CULTURE: NO GROWTH
Lab: NORMAL
SPECIMEN DESCRIPTION: NORMAL

## 2021-08-18 ENCOUNTER — ANESTHESIA (OUTPATIENT)
Dept: OPERATING ROOM | Age: 66
End: 2021-08-18
Payer: MEDICARE

## 2021-08-18 ENCOUNTER — ANESTHESIA EVENT (OUTPATIENT)
Dept: OPERATING ROOM | Age: 66
End: 2021-08-18
Payer: MEDICARE

## 2021-08-18 ENCOUNTER — HOSPITAL ENCOUNTER (OUTPATIENT)
Age: 66
Setting detail: OUTPATIENT SURGERY
Discharge: HOME | End: 2021-08-18
Attending: UROLOGY | Admitting: UROLOGY
Payer: MEDICARE

## 2021-08-18 VITALS
RESPIRATION RATE: 20 BRPM | BODY MASS INDEX: 32.82 KG/M2 | WEIGHT: 229.28 LBS | OXYGEN SATURATION: 98 % | TEMPERATURE: 97.7 F | SYSTOLIC BLOOD PRESSURE: 148 MMHG | DIASTOLIC BLOOD PRESSURE: 79 MMHG | HEIGHT: 70 IN | HEART RATE: 77 BPM

## 2021-08-18 VITALS
RESPIRATION RATE: 22 BRPM | SYSTOLIC BLOOD PRESSURE: 95 MMHG | TEMPERATURE: 96.8 F | DIASTOLIC BLOOD PRESSURE: 55 MMHG | OXYGEN SATURATION: 90 %

## 2021-08-18 DIAGNOSIS — N40.1 LOWER URINARY TRACT SYMPTOMS DUE TO BENIGN PROSTATIC HYPERPLASIA: ICD-10-CM

## 2021-08-18 DIAGNOSIS — G89.18 ACUTE POSTOPERATIVE PAIN: Primary | ICD-10-CM

## 2021-08-18 PROCEDURE — 3600000001 HC SURGERY LEVEL 1  BASE: Performed by: UROLOGY

## 2021-08-18 PROCEDURE — 7100000001 HC PACU RECOVERY - ADDTL 15 MIN: Performed by: UROLOGY

## 2021-08-18 PROCEDURE — 2500000003 HC RX 250 WO HCPCS: Performed by: NURSE ANESTHETIST, CERTIFIED REGISTERED

## 2021-08-18 PROCEDURE — 2580000003 HC RX 258: Performed by: NURSE ANESTHETIST, CERTIFIED REGISTERED

## 2021-08-18 PROCEDURE — 6360000002 HC RX W HCPCS: Performed by: NURSE ANESTHETIST, CERTIFIED REGISTERED

## 2021-08-18 PROCEDURE — 7100000040 HC SPAR PHASE II RECOVERY - FIRST 15 MIN: Performed by: UROLOGY

## 2021-08-18 PROCEDURE — 6360000002 HC RX W HCPCS: Performed by: PHYSICIAN ASSISTANT

## 2021-08-18 PROCEDURE — 3700000000 HC ANESTHESIA ATTENDED CARE: Performed by: UROLOGY

## 2021-08-18 PROCEDURE — 7100000000 HC PACU RECOVERY - FIRST 15 MIN: Performed by: UROLOGY

## 2021-08-18 PROCEDURE — 3700000001 HC ADD 15 MINUTES (ANESTHESIA): Performed by: UROLOGY

## 2021-08-18 PROCEDURE — 2709999900 HC NON-CHARGEABLE SUPPLY: Performed by: UROLOGY

## 2021-08-18 PROCEDURE — 2580000003 HC RX 258: Performed by: UROLOGY

## 2021-08-18 PROCEDURE — 3600000011 HC SURGERY LEVEL 1  ADDTL 15MIN: Performed by: UROLOGY

## 2021-08-18 PROCEDURE — 7100000041 HC SPAR PHASE II RECOVERY - ADDTL 15 MIN: Performed by: UROLOGY

## 2021-08-18 RX ORDER — HYDRALAZINE HYDROCHLORIDE 20 MG/ML
5 INJECTION INTRAMUSCULAR; INTRAVENOUS EVERY 10 MIN PRN
Status: DISCONTINUED | OUTPATIENT
Start: 2021-08-18 | End: 2021-08-18 | Stop reason: HOSPADM

## 2021-08-18 RX ORDER — SODIUM CHLORIDE, SODIUM LACTATE, POTASSIUM CHLORIDE, CALCIUM CHLORIDE 600; 310; 30; 20 MG/100ML; MG/100ML; MG/100ML; MG/100ML
INJECTION, SOLUTION INTRAVENOUS CONTINUOUS
Status: DISCONTINUED | OUTPATIENT
Start: 2021-08-18 | End: 2021-08-18 | Stop reason: HOSPADM

## 2021-08-18 RX ORDER — SODIUM CHLORIDE, SODIUM LACTATE, POTASSIUM CHLORIDE, CALCIUM CHLORIDE 600; 310; 30; 20 MG/100ML; MG/100ML; MG/100ML; MG/100ML
INJECTION, SOLUTION INTRAVENOUS CONTINUOUS PRN
Status: DISCONTINUED | OUTPATIENT
Start: 2021-08-18 | End: 2021-08-18 | Stop reason: SDUPTHER

## 2021-08-18 RX ORDER — FENTANYL CITRATE 50 UG/ML
INJECTION, SOLUTION INTRAMUSCULAR; INTRAVENOUS PRN
Status: DISCONTINUED | OUTPATIENT
Start: 2021-08-18 | End: 2021-08-18 | Stop reason: SDUPTHER

## 2021-08-18 RX ORDER — PROPOFOL 10 MG/ML
INJECTION, EMULSION INTRAVENOUS PRN
Status: DISCONTINUED | OUTPATIENT
Start: 2021-08-18 | End: 2021-08-18 | Stop reason: SDUPTHER

## 2021-08-18 RX ORDER — MIDAZOLAM HYDROCHLORIDE 1 MG/ML
INJECTION INTRAMUSCULAR; INTRAVENOUS PRN
Status: DISCONTINUED | OUTPATIENT
Start: 2021-08-18 | End: 2021-08-18 | Stop reason: SDUPTHER

## 2021-08-18 RX ORDER — HYDROCODONE BITARTRATE AND ACETAMINOPHEN 5; 325 MG/1; MG/1
1 TABLET ORAL
Status: DISCONTINUED | OUTPATIENT
Start: 2021-08-18 | End: 2021-08-18 | Stop reason: HOSPADM

## 2021-08-18 RX ORDER — LIDOCAINE HYDROCHLORIDE 10 MG/ML
INJECTION, SOLUTION EPIDURAL; INFILTRATION; INTRACAUDAL; PERINEURAL PRN
Status: DISCONTINUED | OUTPATIENT
Start: 2021-08-18 | End: 2021-08-18 | Stop reason: SDUPTHER

## 2021-08-18 RX ORDER — MEPERIDINE HYDROCHLORIDE 50 MG/ML
12.5 INJECTION INTRAMUSCULAR; INTRAVENOUS; SUBCUTANEOUS EVERY 5 MIN PRN
Status: DISCONTINUED | OUTPATIENT
Start: 2021-08-18 | End: 2021-08-18 | Stop reason: HOSPADM

## 2021-08-18 RX ORDER — TRAMADOL HYDROCHLORIDE 50 MG/1
50 TABLET ORAL EVERY 6 HOURS PRN
Qty: 5 TABLET | Refills: 0 | Status: SHIPPED | OUTPATIENT
Start: 2021-08-18 | End: 2021-08-21

## 2021-08-18 RX ORDER — PROMETHAZINE HYDROCHLORIDE 25 MG/ML
6.25 INJECTION, SOLUTION INTRAMUSCULAR; INTRAVENOUS
Status: DISCONTINUED | OUTPATIENT
Start: 2021-08-18 | End: 2021-08-18 | Stop reason: HOSPADM

## 2021-08-18 RX ORDER — DEXAMETHASONE SODIUM PHOSPHATE 10 MG/ML
INJECTION INTRAMUSCULAR; INTRAVENOUS PRN
Status: DISCONTINUED | OUTPATIENT
Start: 2021-08-18 | End: 2021-08-18 | Stop reason: SDUPTHER

## 2021-08-18 RX ORDER — METOCLOPRAMIDE HYDROCHLORIDE 5 MG/ML
10 INJECTION INTRAMUSCULAR; INTRAVENOUS
Status: DISCONTINUED | OUTPATIENT
Start: 2021-08-18 | End: 2021-08-18 | Stop reason: HOSPADM

## 2021-08-18 RX ORDER — DIPHENHYDRAMINE HYDROCHLORIDE 50 MG/ML
12.5 INJECTION INTRAMUSCULAR; INTRAVENOUS
Status: DISCONTINUED | OUTPATIENT
Start: 2021-08-18 | End: 2021-08-18 | Stop reason: HOSPADM

## 2021-08-18 RX ADMIN — LIDOCAINE HYDROCHLORIDE 5 ML: 10 INJECTION, SOLUTION EPIDURAL; INFILTRATION; INTRACAUDAL; PERINEURAL at 12:14

## 2021-08-18 RX ADMIN — CEFAZOLIN 2000 MG: 10 INJECTION, POWDER, FOR SOLUTION INTRAVENOUS at 12:29

## 2021-08-18 RX ADMIN — PROPOFOL 200 MG: 10 INJECTION, EMULSION INTRAVENOUS at 12:14

## 2021-08-18 RX ADMIN — FENTANYL CITRATE 50 MCG: 50 INJECTION, SOLUTION INTRAMUSCULAR; INTRAVENOUS at 12:11

## 2021-08-18 RX ADMIN — DEXAMETHASONE SODIUM PHOSPHATE 10 MG: 10 INJECTION INTRAMUSCULAR; INTRAVENOUS at 12:26

## 2021-08-18 RX ADMIN — SODIUM CHLORIDE, POTASSIUM CHLORIDE, SODIUM LACTATE AND CALCIUM CHLORIDE: 600; 310; 30; 20 INJECTION, SOLUTION INTRAVENOUS at 09:29

## 2021-08-18 RX ADMIN — MIDAZOLAM HYDROCHLORIDE 2 MG: 1 INJECTION, SOLUTION INTRAMUSCULAR; INTRAVENOUS at 12:10

## 2021-08-18 RX ADMIN — SODIUM CHLORIDE, POTASSIUM CHLORIDE, SODIUM LACTATE AND CALCIUM CHLORIDE: 600; 310; 30; 20 INJECTION, SOLUTION INTRAVENOUS at 12:04

## 2021-08-18 RX ADMIN — FENTANYL CITRATE 50 MCG: 50 INJECTION, SOLUTION INTRAMUSCULAR; INTRAVENOUS at 12:22

## 2021-08-18 ASSESSMENT — PULMONARY FUNCTION TESTS
PIF_VALUE: 17
PIF_VALUE: 18
PIF_VALUE: 3
PIF_VALUE: 17
PIF_VALUE: 2
PIF_VALUE: 1
PIF_VALUE: 17
PIF_VALUE: 17
PIF_VALUE: 1
PIF_VALUE: 6
PIF_VALUE: 22
PIF_VALUE: 17
PIF_VALUE: 18
PIF_VALUE: 17
PIF_VALUE: 21
PIF_VALUE: 11
PIF_VALUE: 17
PIF_VALUE: 2
PIF_VALUE: 17
PIF_VALUE: 15
PIF_VALUE: 2
PIF_VALUE: 17
PIF_VALUE: 0
PIF_VALUE: 17
PIF_VALUE: 17
PIF_VALUE: 1
PIF_VALUE: 11
PIF_VALUE: 17
PIF_VALUE: 2
PIF_VALUE: 17
PIF_VALUE: 1
PIF_VALUE: 17
PIF_VALUE: 0
PIF_VALUE: 23
PIF_VALUE: 17

## 2021-08-18 ASSESSMENT — PAIN - FUNCTIONAL ASSESSMENT: PAIN_FUNCTIONAL_ASSESSMENT: 0-10

## 2021-08-18 ASSESSMENT — PAIN SCALES - GENERAL: PAINLEVEL_OUTOF10: 0

## 2021-08-18 NOTE — ANESTHESIA POSTPROCEDURE EVALUATION
POST- ANESTHESIA EVALUATION       Pt Name: Sudha Hernandez  MRN: [id number]  YOB: 1955  Date of evaluation: 8/18/2021  Time:  2:02 PM      /63   Pulse 69   Temp 97.9 °F (36.6 °C) (Temporal)   Resp 14   Ht 5' 9.5\" (1.765 m)   Wt 229 lb 4.5 oz (104 kg)   SpO2 96%   BMI 33.37 kg/m²      Consciousness Level  Awake  Cardiopulmonary Status  Stable  Pain Adequately Treated YES  Nausea / Vomiting  NO  Adequate Hydration  YES  Anesthesia Related Complications NONE      Electronically signed by Janae Palafox MD on 8/18/2021 at 2:02 PM       Department of Anesthesiology  Postprocedure Note    Patient: Sudha Hernandez  MRN: [id number]  YOB: 1955  Date of evaluation: 8/18/2021  Time:  2:01 PM     Procedure Summary     Date: 08/18/21 Room / Location: [address]    Anesthesia Start: 0068 Anesthesia Stop: 6562    Procedure: ESWL EXTRACORPOREAL SHOCK WAVE LITHOTRIPSY (Right ) Diagnosis: (RIGHT KIDNEY STONE)    Surgeons: Reta Sen MD Responsible Provider: Janae Palafox MD    Anesthesia Type: general ASA Status: 2          Anesthesia Type: general    Charisse Phase I: Charisse Score: 10    Charisse Phase II: Charisse Score: 10    Last vitals: Reviewed and per EMR flowsheets.        Anesthesia Post Evaluation

## 2021-08-18 NOTE — ANESTHESIA PRE PROCEDURE
Department of Anesthesiology  Preprocedure Note       Name:  Divya Fountain   Age:  77 y.o.  :  1955                                          MRN:  [id number]         Date:  2021      Surgeon: Kanika Hernandez):  Ambrosio Blount MD    Procedure: Procedure(s):  ESWL EXTRACORPOREAL SHOCK WAVE LITHOTRIPSY  (NEX-MED CONF# [id number])    Medications prior to admission:   Prior to Admission medications    Medication Sig Start Date End Date Taking? Authorizing Provider   tamsulosin (FLOMAX) 0.4 MG capsule TAKE 1 CAPSULE BY MOUTH EVERY DAY 21  Yes Logan Chung MD   celecoxib (CELEBREX) 200 MG capsule Take 1 capsule by mouth 2 times daily 3/29/21  Yes oLgan Chung MD   Blood Pressure KIT 1 Units by Does not apply route daily 21  Yes Logan Chung MD       Current medications:    Current Facility-Administered Medications   Medication Dose Route Frequency Provider Last Rate Last Admin    ceFAZolin (ANCEF) 2000 mg in dextrose 5 % 50 mL IVPB  2,000 mg Intravenous On Call to [address] [facility], [facility]        lactated ringers infusion   Intravenous Continuous Ambrosio Blount MD           Allergies:     Allergies   Allergen Reactions    Codeine      Uncomfortable feeling- sea sick feeling    Hydrocodone Nausea Only       Problem List:    Patient Active Problem List   Diagnosis Code    Unilateral primary osteoarthritis, right knee M17.11    Depression F32.9    Elevated BP without diagnosis of hypertension R03.0    Benign prostatic hyperplasia without urinary obstruction N40.0    Lower urinary tract symptoms due to benign prostatic hyperplasia N40.1    Urge urinary incontinence N39.41    Kidney stone N20.0       Past Medical History:        Diagnosis Date    Chronic pain of right knee 2019    Depression     Diverticulitis     H/O right flank pain     Kidney stones     Knee pain     Night sweats     Osteoarthritis     Chronic    Tooth loose     rt front upper with plastic cap    Wears glasses reading    Weight loss        Past Surgical History:        Procedure Laterality Date    CARPAL TUNNEL RELEASE Bilateral     1990's    COLONOSCOPY N/A 1/6/2020    COLONOSCOPY WITH BIOPSY performed by Berta Lujan MD at [address] Right     KNEE ARTHROSCOPY Right     multiple times    ROTATOR CUFF REPAIR Left        Social History:    Social History     Tobacco Use    Smoking status: Never Smoker    Smokeless tobacco: Never Used   Substance Use Topics    Alcohol use: Not Currently     Alcohol/week: 0.0 standard drinks                                Counseling given: Not Answered      Vital Signs (Current):   Vitals:    08/17/21 1305 08/18/21 0858   Weight: 232 lb (105.2 kg) 229 lb 4.5 oz (104 kg)   Height: 5' 11\" (1.803 m) 5' 9.5\" (1.765 m)                                              BP Readings from Last 3 Encounters:   08/04/21 (!) 138/50   02/25/21 138/70   02/10/21 (!) 152/72       NPO Status:                                                                                 BMI:   Wt Readings from Last 3 Encounters:   08/18/21 229 lb 4.5 oz (104 kg)   08/04/21 232 lb (105.2 kg)   02/25/21 231 lb 12.8 oz (105.1 kg)     Body mass index is 33.37 kg/m². CBC:   Lab Results   Component Value Date    WBC 5.6 06/12/2020    RBC 5.49 06/12/2020    HGB 15.5 06/12/2020    HCT 49.7 06/12/2020    MCV 90.5 06/12/2020    RDW 14.1 06/12/2020     06/12/2020       CMP:   Lab Results   Component Value Date     06/12/2020    K 4.8 06/12/2020     06/12/2020    CO2 25 06/12/2020    BUN 17 06/12/2020    CREATININE 1.00 02/19/2021    GFRAA >60 02/19/2021    LABGLOM >60 02/19/2021    GLUCOSE 100 06/12/2020    PROT 7.4 06/12/2020    CALCIUM 8.9 06/12/2020    BILITOT 0.18 06/12/2020    ALKPHOS 98 06/12/2020    AST 11 06/12/2020    ALT 11 06/12/2020       POC Tests: No results for input(s): POCGLU, POCNA, POCK, POCCL, POCBUN, POCHEMO, POCHCT in the last 72 hours.     Coags: No results found for: PROTIME, INR, APTT    HCG (If Applicable): No results found for: PREGTESTUR, PREGSERUM, HCG, HCGQUANT     ABGs: No results found for: PHART, PO2ART, VOZ5CUQ, THC5JVH, BEART, Q7MAZQSR     Type & Screen (If Applicable):  No results found for: LABABO, LABRH    Drug/Infectious Status (If Applicable):  Lab Results   Component Value Date    HEPCAB NONREACTIVE 11/19/2019       COVID-19 Screening (If Applicable):   Lab Results   Component Value Date    COVID19 NEGATIVE 06/12/2020           Anesthesia Evaluation  Patient summary reviewed and Nursing notes reviewed  Airway: Mallampati: IV  TM distance: >3 FB     Mouth opening: > = 3 FB Dental:      Comment: Loose upper right incisor    Pulmonary:Negative Pulmonary ROS and normal exam                               Cardiovascular:Negative CV ROS                      Neuro/Psych:   Negative Neuro/Psych ROS              GI/Hepatic/Renal:   (+) renal disease: kidney stones,           Endo/Other: Negative Endo/Other ROS                    Abdominal:             Vascular: Other Findings:           Anesthesia Plan      general     ASA 2       Induction: intravenous. MIPS: Postoperative opioids intended and Prophylactic antiemetics administered. Anesthetic plan and risks discussed with patient. Plan discussed with CRNA.                   Leeann Alatorre MD   8/18/2021

## 2021-08-18 NOTE — H&P
Pre-op History and Physical  Tracy Pisano PA-C    Patient:  Flash Power  MRN: [id number]  YOB: 1955    HISTORY OF PRESENT ILLNESS:     The patient is a 77 y.o. male who presents with right kidney stone. Here for ESWL Right. Patient's old records, notes and chart reviewed and summarized above. Tracy Pisano PA-C independently reviewed the images and verified the radiology reports from:    No results found. Past Medical History:    Past Medical History:   Diagnosis Date    Chronic pain of right knee 12/5/2019    Depression     Diverticulitis     H/O right flank pain     Kidney stones     Knee pain     Night sweats     Osteoarthritis     Chronic    Tooth loose     rt front upper with plastic cap    Wears glasses     reading    Weight loss        Past Surgical History:    Past Surgical History:   Procedure Laterality Date    CARPAL TUNNEL RELEASE Bilateral     1990's    COLONOSCOPY N/A 1/6/2020    COLONOSCOPY WITH BIOPSY performed by Lucretia Orozco MD at [place] [address] Right     KNEE ARTHROSCOPY Right     multiple times    ROTATOR CUFF REPAIR Left        Medications Prior to Admission:    Prior to Admission medications    Medication Sig Start Date End Date Taking?  Authorizing Provider   tamsulosin (FLOMAX) 0.4 MG capsule TAKE 1 CAPSULE BY MOUTH EVERY DAY 5/8/21  Yes Beverley Orona MD   celecoxib (CELEBREX) 200 MG capsule Take 1 capsule by mouth 2 times daily 3/29/21  Yes Beverley Orona MD   Blood Pressure KIT 1 Units by Does not apply route daily 2/25/21  Yes Beverley Orona MD       Allergies:  Codeine and Hydrocodone    Social History:    Social History     Socioeconomic History    Marital status:      Spouse name: Not on file    Number of children: Not on file    Years of education: Not on file    Highest education level: Not on file   Occupational History    Occupation: factory     Employer: [employer]   Tobacco Use    Smoking status: Never Smoker    Smokeless tobacco: Never Used   Vaping Use    Vaping Use: Never used   Substance and Sexual Activity    Alcohol use: Not Currently     Alcohol/week: 0.0 standard drinks    Drug use: Never    Sexual activity: Not Currently   Other Topics Concern    Not on file   Social History Narrative    Not on file     Social Determinants of Health     Financial Resource Strain: Low Risk     Difficulty of Paying Living Expenses: Not hard at all   Food Insecurity: No Food Insecurity    Worried About Running Out of Food in the Last Year: Never true    Dianne of Food in the Last Year: Never true   Transportation Needs: No Transportation Needs    Lack of Transportation (Medical): No    Lack of Transportation (Non-Medical):  No   Physical Activity:     Days of Exercise per Week:     Minutes of Exercise per Session:    Stress:     Feeling of Stress :    Social Connections:     Frequency of Communication with Friends and Family:     Frequency of Social Gatherings with Friends and Family:     Attends Religion Services:     Active Member of Clubs or Organizations:     Attends Club or Organization Meetings:     Marital Status:    Intimate Partner Violence:     Fear of Current or Ex-Partner:     Emotionally Abused:     Physically Abused:     Sexually Abused:        Family History:    Family History   Problem Relation Age of Onset    No Known Problems Mother     Cancer Father     Diabetes type 2  Father     COPD Brother     No Known Problems Maternal Grandmother     No Known Problems Maternal Grandfather     No Known Problems Paternal Grandmother     No Known Problems Paternal Grandfather     Obesity Maternal Cousin     Heart Attack Brother        REVIEW OF SYSTEMS:  Constitutional: negative  Eyes: negative  Respiratory: negative  Cardiovascular: negative  Gastrointestinal: negative  Genitourinary: no acute issues  Musculoskeletal: negative  Skin: negative   Neurological: negative  Hematological/Lymphatic: negative  Psychological: negative    PHYSICAL EXAM:    Patient Vitals for the past 24 hrs:   Height Weight   08/17/21 1305 5' 11\" (1.803 m) 232 lb (105.2 kg)     Constitutional: Patient in NAD  Neuro: Alert and oriented to person, place, and time  Psych: Mood and affect normal  Skin: Clean, dry, intact   Lungs: Respiratory effort normal, CTA  Cardiovascular:  Normal peripheral pulses; no murmur. Normal rhythm  Abdomen: Soft, non-tender, non-distended, no hepatosplenomegaly or hernia, CVA tenderness - mild Right  Bladder: Non-tender and non-disdended   : Non-tender, skin intact, no lesions       LABS:   No results for input(s): WBC, HGB, HCT, MCV, PLT in the last 72 hours. No results for input(s): NA, K, CL, CO2, PHOS, BUN, CREATININE in the last 72 hours. Invalid input(s): CA  Lab Results   Component Value Date    PSA 0.76 11/19/2019    PSA 0.69 04/18/2017         Urinalysis: No results for input(s): COLORU, PHUR, LABCAST, WBCUA, RBCUA, MUCUS, TRICHOMONAS, YEAST, BACTERIA, CLARITYU, SPECGRAV, LEUKOCYTESUR, UROBILINOGEN, Pearlean Hakim in the last 72 hours. Invalid input(s): NITRATE, GLUCOSEUKETONESUAMORPHOUS     -----------------------------------------------------------------    ASSESSMENT AND PLAN:    Impression:    Right nephrolithiasis    Patient Active Problem List   Diagnosis    Unilateral primary osteoarthritis, right knee    Depression    Elevated BP without diagnosis of hypertension    Benign prostatic hyperplasia without urinary obstruction    Lower urinary tract symptoms due to benign prostatic hyperplasia    Urge urinary incontinence    Kidney stone       Plan: RIGHT ESWL in OR today. Consent obtained    The patient was counseled at length about the risks of noelle Covid-19 during their perioperative period and any recovery window from their procedure.   The patient was made aware that noelle Covid-19  may worsen their prognosis for recovering from their procedure  and lend to a higher morbidity and/or mortality risk. All material risks, benefits, and reasonable alternatives including postponing the procedure were discussed. The patient does wish to proceed with the procedure at this time.         Rome Monk PA-C  8:53 AM 8/18/2021

## 2021-08-23 ENCOUNTER — APPOINTMENT (OUTPATIENT)
Dept: CT IMAGING | Facility: CLINIC | Age: 66
End: 2021-08-23
Payer: MEDICARE

## 2021-08-23 ENCOUNTER — TELEPHONE (OUTPATIENT)
Dept: FAMILY MEDICINE CLINIC | Age: 66
End: 2021-08-23

## 2021-08-23 ENCOUNTER — HOSPITAL ENCOUNTER (EMERGENCY)
Facility: CLINIC | Age: 66
Discharge: ANOTHER HOSPITAL | End: 2021-08-24
Attending: EMERGENCY MEDICINE
Payer: MEDICARE

## 2021-08-23 DIAGNOSIS — N13.9 OBSTRUCTIVE UROPATHY: ICD-10-CM

## 2021-08-23 DIAGNOSIS — N20.1 RIGHT URETERAL CALCULUS: ICD-10-CM

## 2021-08-23 DIAGNOSIS — N23 URETERIC COLIC: Primary | ICD-10-CM

## 2021-08-23 LAB
-: ABNORMAL
ABSOLUTE EOS #: 0.1 K/UL (ref 0–0.4)
ABSOLUTE IMMATURE GRANULOCYTE: ABNORMAL K/UL (ref 0–0.3)
ABSOLUTE LYMPH #: 1.2 K/UL (ref 1–4.8)
ABSOLUTE MONO #: 0.6 K/UL (ref 0.1–1.2)
AMORPHOUS: ABNORMAL
ANION GAP SERPL CALCULATED.3IONS-SCNC: 14 MMOL/L (ref 9–17)
BACTERIA: ABNORMAL
BASOPHILS # BLD: 1 % (ref 0–2)
BASOPHILS ABSOLUTE: 0.1 K/UL (ref 0–0.2)
BILIRUBIN URINE: NEGATIVE
BUN BLDV-MCNC: 22 MG/DL (ref 8–23)
BUN/CREAT BLD: ABNORMAL (ref 9–20)
CALCIUM SERPL-MCNC: 10 MG/DL (ref 8.6–10.4)
CASTS UA: ABNORMAL /LPF (ref 0–2)
CHLORIDE BLD-SCNC: 106 MMOL/L (ref 98–107)
CO2: 23 MMOL/L (ref 20–31)
COLOR: YELLOW
COMMENT UA: ABNORMAL
CREAT SERPL-MCNC: 1.4 MG/DL (ref 0.7–1.2)
CRYSTALS, UA: ABNORMAL /HPF
DIFFERENTIAL TYPE: ABNORMAL
EOSINOPHILS RELATIVE PERCENT: 1 % (ref 1–4)
EPITHELIAL CELLS UA: ABNORMAL /HPF (ref 0–5)
GFR AFRICAN AMERICAN: >60 ML/MIN
GFR NON-AFRICAN AMERICAN: 51 ML/MIN
GFR SERPL CREATININE-BSD FRML MDRD: ABNORMAL ML/MIN/{1.73_M2}
GFR SERPL CREATININE-BSD FRML MDRD: ABNORMAL ML/MIN/{1.73_M2}
GLUCOSE BLD-MCNC: 140 MG/DL (ref 70–99)
GLUCOSE URINE: ABNORMAL
HCT VFR BLD CALC: 49.5 % (ref 41–53)
HEMOGLOBIN: 16.4 G/DL (ref 13.5–17.5)
IMMATURE GRANULOCYTES: ABNORMAL %
KETONES, URINE: ABNORMAL
LEUKOCYTE ESTERASE, URINE: NEGATIVE
LYMPHOCYTES # BLD: 13 % (ref 24–44)
MCH RBC QN AUTO: 30.3 PG (ref 26–34)
MCHC RBC AUTO-ENTMCNC: 33.2 G/DL (ref 31–37)
MCV RBC AUTO: 91.3 FL (ref 80–100)
MONOCYTES # BLD: 6 % (ref 2–11)
MUCUS: ABNORMAL
NITRITE, URINE: NEGATIVE
NRBC AUTOMATED: ABNORMAL PER 100 WBC
OTHER OBSERVATIONS UA: ABNORMAL
PDW BLD-RTO: 14 % (ref 12.5–15.4)
PH UA: 6.5 (ref 5–8)
PLATELET # BLD: 248 K/UL (ref 140–450)
PLATELET ESTIMATE: ABNORMAL
PMV BLD AUTO: 7.5 FL (ref 6–12)
POTASSIUM SERPL-SCNC: 3.8 MMOL/L (ref 3.7–5.3)
PROTEIN UA: ABNORMAL
RBC # BLD: 5.42 M/UL (ref 4.5–5.9)
RBC # BLD: ABNORMAL 10*6/UL
RBC UA: ABNORMAL /HPF (ref 0–2)
RENAL EPITHELIAL, UA: ABNORMAL /HPF
SEG NEUTROPHILS: 79 % (ref 36–66)
SEGMENTED NEUTROPHILS ABSOLUTE COUNT: 7.5 K/UL (ref 1.8–7.7)
SODIUM BLD-SCNC: 143 MMOL/L (ref 135–144)
SPECIFIC GRAVITY UA: 1.03 (ref 1–1.03)
TRICHOMONAS: ABNORMAL
TURBIDITY: CLEAR
URINE HGB: ABNORMAL
UROBILINOGEN, URINE: NORMAL
WBC # BLD: 9.4 K/UL (ref 3.5–11)
WBC # BLD: ABNORMAL 10*3/UL
WBC UA: ABNORMAL /HPF (ref 0–5)
YEAST: ABNORMAL

## 2021-08-23 PROCEDURE — 96375 TX/PRO/DX INJ NEW DRUG ADDON: CPT

## 2021-08-23 PROCEDURE — 74176 CT ABD & PELVIS W/O CONTRAST: CPT

## 2021-08-23 PROCEDURE — 99285 EMERGENCY DEPT VISIT HI MDM: CPT

## 2021-08-23 PROCEDURE — 96376 TX/PRO/DX INJ SAME DRUG ADON: CPT

## 2021-08-23 PROCEDURE — 96374 THER/PROPH/DIAG INJ IV PUSH: CPT

## 2021-08-23 PROCEDURE — 6360000002 HC RX W HCPCS: Performed by: EMERGENCY MEDICINE

## 2021-08-23 PROCEDURE — 2580000003 HC RX 258: Performed by: EMERGENCY MEDICINE

## 2021-08-23 PROCEDURE — 81001 URINALYSIS AUTO W/SCOPE: CPT

## 2021-08-23 PROCEDURE — 36415 COLL VENOUS BLD VENIPUNCTURE: CPT

## 2021-08-23 PROCEDURE — 85025 COMPLETE CBC W/AUTO DIFF WBC: CPT

## 2021-08-23 PROCEDURE — 80048 BASIC METABOLIC PNL TOTAL CA: CPT

## 2021-08-23 RX ORDER — SODIUM CHLORIDE 9 MG/ML
1000 INJECTION, SOLUTION INTRAVENOUS CONTINUOUS
Status: DISCONTINUED | OUTPATIENT
Start: 2021-08-23 | End: 2021-08-24 | Stop reason: HOSPADM

## 2021-08-23 RX ORDER — KETOROLAC TROMETHAMINE 15 MG/ML
15 INJECTION, SOLUTION INTRAMUSCULAR; INTRAVENOUS ONCE
Status: COMPLETED | OUTPATIENT
Start: 2021-08-23 | End: 2021-08-23

## 2021-08-23 RX ORDER — FENTANYL CITRATE 50 UG/ML
100 INJECTION, SOLUTION INTRAMUSCULAR; INTRAVENOUS ONCE
Status: COMPLETED | OUTPATIENT
Start: 2021-08-23 | End: 2021-08-24

## 2021-08-23 RX ORDER — ONDANSETRON 2 MG/ML
8 INJECTION INTRAMUSCULAR; INTRAVENOUS ONCE
Status: COMPLETED | OUTPATIENT
Start: 2021-08-23 | End: 2021-08-23

## 2021-08-23 RX ORDER — TRAMADOL HYDROCHLORIDE 50 MG/1
50 TABLET ORAL EVERY 6 HOURS PRN
Status: ON HOLD | COMMUNITY
End: 2021-08-26 | Stop reason: SDUPTHER

## 2021-08-23 RX ORDER — 0.9 % SODIUM CHLORIDE 0.9 %
1000 INTRAVENOUS SOLUTION INTRAVENOUS ONCE
Status: COMPLETED | OUTPATIENT
Start: 2021-08-23 | End: 2021-08-23

## 2021-08-23 RX ORDER — ONDANSETRON 2 MG/ML
4 INJECTION INTRAMUSCULAR; INTRAVENOUS EVERY 6 HOURS PRN
Status: DISCONTINUED | OUTPATIENT
Start: 2021-08-23 | End: 2021-08-24 | Stop reason: HOSPADM

## 2021-08-23 RX ORDER — KETOROLAC TROMETHAMINE 15 MG/ML
15 INJECTION, SOLUTION INTRAMUSCULAR; INTRAVENOUS ONCE
Status: COMPLETED | OUTPATIENT
Start: 2021-08-23 | End: 2021-08-24

## 2021-08-23 RX ORDER — FENTANYL CITRATE 50 UG/ML
50 INJECTION, SOLUTION INTRAMUSCULAR; INTRAVENOUS ONCE
Status: COMPLETED | OUTPATIENT
Start: 2021-08-23 | End: 2021-08-23

## 2021-08-23 RX ADMIN — FENTANYL CITRATE 50 MCG: 50 INJECTION, SOLUTION INTRAMUSCULAR; INTRAVENOUS at 15:30

## 2021-08-23 RX ADMIN — HYDROMORPHONE HYDROCHLORIDE 1 MG: 1 INJECTION, SOLUTION INTRAMUSCULAR; INTRAVENOUS; SUBCUTANEOUS at 17:02

## 2021-08-23 RX ADMIN — ONDANSETRON 8 MG: 2 INJECTION INTRAMUSCULAR; INTRAVENOUS at 15:30

## 2021-08-23 RX ADMIN — SODIUM CHLORIDE 1000 ML: 9 INJECTION, SOLUTION INTRAVENOUS at 15:30

## 2021-08-23 RX ADMIN — SODIUM CHLORIDE 1000 ML: 9 INJECTION, SOLUTION INTRAVENOUS at 18:45

## 2021-08-23 RX ADMIN — KETOROLAC TROMETHAMINE 15 MG: 15 INJECTION, SOLUTION INTRAMUSCULAR; INTRAVENOUS at 15:30

## 2021-08-23 ASSESSMENT — PAIN SCALES - GENERAL
PAINLEVEL_OUTOF10: 7
PAINLEVEL_OUTOF10: 7
PAINLEVEL_OUTOF10: 8

## 2021-08-23 ASSESSMENT — PAIN DESCRIPTION - LOCATION: LOCATION: FLANK

## 2021-08-23 ASSESSMENT — PAIN DESCRIPTION - ORIENTATION: ORIENTATION: RIGHT

## 2021-08-23 ASSESSMENT — PAIN DESCRIPTION - PAIN TYPE: TYPE: ACUTE PAIN

## 2021-08-23 NOTE — ED NOTES
Bed: ER11  Expected date:   Expected time:   Means of arrival:   Comments:  Oracio Case, RN  08/23/21 6180

## 2021-08-23 NOTE — ED NOTES
[facility] called for consult with patient Urologist Dr. Kimberly Huber, RN  08/23/21 4036

## 2021-08-23 NOTE — ED NOTES
Dr. Carson Fernandes to bedside to reassess et discuss plan of care with patient      Roseanne Mooney RN  08/23/21 0593

## 2021-08-23 NOTE — ED PROVIDER NOTES
[address] ED  EMERGENCY DEPARTMENT ENCOUNTER      Pt Name: Cristino Bertrand  MRN: [id number]  [place] 1955  Date of evaluation: 8/23/2021  Provider: Serena Preciado MD    [address]     Chief Complaint   Patient presents with    Flank Pain     complaining of right flank and \"side: pain, he states that he had lithotripsy yesterday at [facility], pain suddenly increased today         HISTORY OF PRESENT ILLNESS   (Location/Symptom, Timing/Onset, Context/Setting,Quality, Duration, Modifying Factors, Severity)  Note limiting factors. Cristino Bertrand is a 77 y.o. male who presents to the emergency department by EMS with chief complaint of a sudden onset of severe right flank pain starting around 11 AM today, accompanied with nausea, vomiting and diaphoresis. Patient underwent lithotripsy of a right renal calculus 5 days ago and states that he had felt fine until this morning. The history is provided by the patient, medical records and the EMS personnel. Nursing Notes werereviewed. REVIEW OF SYSTEMS    (2-9 systems for level 4, 10 or more for level 5)     Review of Systems   All other systems reviewed and are negative. Except as noted above the remainder of the review of systems was reviewed and negative.        PAST MEDICAL HISTORY     Past Medical History:   Diagnosis Date    Chronic pain of right knee 12/5/2019    Depression     Diverticulitis     H/O right flank pain     Kidney stones     Knee pain     Night sweats     Osteoarthritis     Chronic    Tooth loose     rt front upper with plastic cap    Wears glasses     reading    Weight loss          SURGICALHISTORY       Past Surgical History:   Procedure Laterality Date    CARPAL TUNNEL RELEASE Bilateral     1990's    COLONOSCOPY N/A 1/6/2020    COLONOSCOPY WITH BIOPSY performed by Marlen Yan MD at [address] Right     KNEE ARTHROSCOPY Right     multiple times    LITHOTRIPSY Right 8/18/2021 ESWL EXTRACORPOREAL SHOCK WAVE LITHOTRIPSY performed by Fabian Jaquez MD at [address] Left          CURRENT MEDICATIONS       Previous Medications    BLOOD PRESSURE KIT    1 Units by Does not apply route daily    CELECOXIB (CELEBREX) 200 MG CAPSULE    Take 1 capsule by mouth 2 times daily    TAMSULOSIN (FLOMAX) 0.4 MG CAPSULE    TAKE 1 CAPSULE BY MOUTH EVERY DAY    TRAMADOL (ULTRAM) 50 MG TABLET    Take 50 mg by mouth every 6 hours as needed for Pain. ALLERGIES     Codeine and Hydrocodone    FAMILY HISTORY       Family History   Problem Relation Age of Onset    No Known Problems Mother     Cancer Father     Diabetes type 2  Father     COPD Brother     No Known Problems Maternal Grandmother     No Known Problems Maternal Grandfather     No Known Problems Paternal Grandmother     No Known Problems Paternal Grandfather     Obesity Maternal Cousin     Heart Attack Brother           SOCIAL HISTORY       Social History     Socioeconomic History    Marital status:      Spouse name: None    Number of children: None    Years of education: None    Highest education level: None   Occupational History    Occupation: [employer]     Employer: [employer]   Tobacco Use    Smoking status: Never Smoker    Smokeless tobacco: Never Used   Vaping Use    Vaping Use: Never used   Substance and Sexual Activity    Alcohol use: Not Currently     Alcohol/week: 0.0 standard drinks    Drug use: Never    Sexual activity: Not Currently   Other Topics Concern    None   Social History Narrative    None     Social Determinants of Health     Financial Resource Strain: Low Risk     Difficulty of Paying Living Expenses: Not hard at all   Food Insecurity: No Food Insecurity    Worried About Running Out of Food in the Last Year: Never true    Dianne of Food in the Last Year: Never true   Transportation Needs: No Transportation Needs    Lack of Transportation (Medical):  No    Lack of Transportation (Non-Medical): No   Physical Activity:     Days of Exercise per Week:     Minutes of Exercise per Session:    Stress:     Feeling of Stress :    Social Connections:     Frequency of Communication with Friends and Family:     Frequency of Social Gatherings with Friends and Family:     Attends Yarsanism Services:     Active Member of Clubs or Organizations:     Attends Club or Organization Meetings:     Marital Status:    Intimate Partner Violence:     Fear of Current or Ex-Partner:     Emotionally Abused:     Physically Abused:     Sexually Abused:        SCREENINGS    Shen Coma Scale  Eye Opening: Spontaneous  Best Verbal Response: Oriented  Best Motor Response: Obeys commands  Justin Coma Scale Score: 15        PHYSICAL EXAM    (up to 7 for level 4, 8 or more for level 5)     ED Triage Vitals [08/23/21 1502]   BP Temp Temp src Pulse Resp SpO2 Height Weight   (!) 207/84 97.4 °F (36.3 °C) -- 60 20 97 % -- 230 lb (104.3 kg)       Physical Exam  Vitals reviewed. Constitutional:       General: He is in acute distress. Appearance: He is diaphoretic. HENT:      Head: Normocephalic. Right Ear: External ear normal.      Left Ear: External ear normal.      Nose: Nose normal.   Eyes:      Extraocular Movements: Extraocular movements intact. Cardiovascular:      Rate and Rhythm: Normal rate and regular rhythm. Heart sounds: Normal heart sounds. Pulmonary:      Effort: Pulmonary effort is normal. No respiratory distress. Breath sounds: Normal breath sounds. No rhonchi or rales. Abdominal:      Palpations: Abdomen is soft. Tenderness: There is no abdominal tenderness. Musculoskeletal:         General: No swelling or tenderness. Normal range of motion. Cervical back: Neck supple. Skin:     General: Skin is warm. Coloration: Skin is not pale. Neurological:      General: No focal deficit present.       Mental Status: He is alert and oriented to person, place, and time. DIAGNOSTIC RESULTS     EKG: All EKG's are interpreted by the Emergency Department Physician who either signs orCo-signs this chart in the absence of a cardiologist.    RADIOLOGY:     Interpretation per the Radiologist below, ifavailable at the time of this note:    CT ABDOMEN PELVIS WO CONTRAST Additional Contrast? None   Final Result   Right-sided obstructive uropathy with a 5 mm stone in the right mid ureter. ED BEDSIDE ULTRASOUND:   Performed by ED Physician - none    LABS:  Labs Reviewed   CBC WITH AUTO DIFFERENTIAL - Abnormal; Notable for the following components:       Result Value    Seg Neutrophils 79 (*)     Lymphocytes 13 (*)     All other components within normal limits   BASIC METABOLIC PANEL W/ REFLEX TO MG FOR LOW K - Abnormal; Notable for the following components:    Glucose 140 (*)     CREATININE 1.40 (*)     GFR Non- 51 (*)     All other components within normal limits   URINE RT REFLEX TO CULTURE - Abnormal; Notable for the following components:    Glucose, Ur 1+ (*)     Ketones, Urine SMALL (*)     Urine Hgb LARGE (*)     Protein, UA 1+ (*)     All other components within normal limits   MICROSCOPIC URINALYSIS - Abnormal; Notable for the following components:    Other Observations UA   (*)     Value: Utilizing a urinalysis as the only screening method to exclude a potential uropathogen can be unreliable in many patient populations. Rapid screening tests are less sensitive than culture and if UTI is a clinical possibility, culture should be considered despite a negative urinalysis. All other components within normal limits       All other labs were within normal range ornot returned as of this dictation.     EMERGENCY DEPARTMENT COURSE and DIFFERENTIAL DIAGNOSIS/MDM:   Vitals:    Vitals:    08/23/21 1502 08/23/21 1504 08/23/21 1936   BP: (!) 207/84 (!) 196/82 (!) 169/96   Pulse: 60  61   Resp: 20  16   Temp: 97.4 °F (36.3 °C) SpO2: 97%  94%   Weight: 104.3 kg (230 lb)              Patient has an obstructing 5 mm calculus in the mid right ureter. Urinalysis does not show signs of infection. The white cell count is normal at 9.4. Blood glucose is 140 and the creatinine is slightly elevated 1.4. Patient was treated upon arrival with IV Toradol and IV fentanyl with relief but his pain kept recurring and he has required repeated dosing with IV Dilaudid for pain control. Findings were discussed with Dr. Gerber Brice on-call for his urology group who recommends admission at [address] [place] [address] where he has been accepted by Jacoby Traylor CNP, covering for Dr. Silvia Solares. MDM    CONSULTS:  None    PROCEDURES:  Unlessotherwise noted below, none     Procedures    FINAL IMPRESSION      1. Ureteric colic    2. Obstructive uropathy    3. Right ureteral calculus          DISPOSITION/PLAN   DISPOSITION Decision To Admit 08/23/2021 05:51:24 PM      PATIENT REFERRED TO:  No follow-up provider specified. DISCHARGE MEDICATIONS:         Problem List:  Patient Active Problem List   Diagnosis Code    Unilateral primary osteoarthritis, right knee M17.11    Depression F32.9    Elevated BP without diagnosis of hypertension R03.0    Benign prostatic hyperplasia without urinary obstruction N40.0    Lower urinary tract symptoms due to benign prostatic hyperplasia N40.1    Urge urinary incontinence N39.41    Kidney stone N20.0           Summation      Patient Course: Transferred.     ED Medicationsadministered this visit:    Medications   0.9 % sodium chloride infusion (1,000 mLs Intravenous New Bag 8/23/21 1845)   HYDROmorphone (DILAUDID) injection 0.5 mg (has no administration in time range)   ondansetron (ZOFRAN) injection 4 mg (has no administration in time range)   0.9 % sodium chloride infusion (has no administration in time range)   0.9 % sodium chloride bolus (0 mLs Intravenous Stopped 8/23/21 1856)   ondansetron (ZOFRAN) injection 8 mg (8 mg Intravenous Given 8/23/21 1530)   fentaNYL (SUBLIMAZE) injection 50 mcg (50 mcg Intravenous Given 8/23/21 1530)   ketorolac (TORADOL) injection 15 mg (15 mg Intravenous Given 8/23/21 1530)   HYDROmorphone (DILAUDID) injection 1 mg (1 mg Intravenous Given 8/23/21 1702)       New Prescriptions from this visit:    New Prescriptions    No medications on file       Follow-up:  No follow-up provider specified. Final Impression:   1. Ureteric colic    2. Obstructive uropathy    3.  Right ureteral calculus               (Please note that portions of this note were completed with a voice recognitionprogram.  Efforts were made to edit the dictations but occasionally words are mis-transcribed.)    Vish Childers MD (electronically signed)  Attending Emergency Physician            Vish Childers MD  08/23/21 2002       Vish Childers MD  08/27/21 9014

## 2021-08-23 NOTE — TELEPHONE ENCOUNTER
Patient called as he believed he is having a \"kidney stone attack\". Patient stated he is sweating, throwing up, and his heart is beating very fast. Patient asked if he should call 911 to go to the ER and was instructed to do so.

## 2021-08-24 ENCOUNTER — HOSPITAL ENCOUNTER (INPATIENT)
Age: 66
LOS: 2 days | Discharge: HOME | DRG: 661 | End: 2021-08-26
Attending: INTERNAL MEDICINE | Admitting: STUDENT IN AN ORGANIZED HEALTH CARE EDUCATION/TRAINING PROGRAM
Payer: MEDICARE

## 2021-08-24 VITALS
RESPIRATION RATE: 16 BRPM | DIASTOLIC BLOOD PRESSURE: 92 MMHG | TEMPERATURE: 97.4 F | SYSTOLIC BLOOD PRESSURE: 166 MMHG | WEIGHT: 230 LBS | OXYGEN SATURATION: 96 % | HEART RATE: 66 BPM | BODY MASS INDEX: 33.48 KG/M2

## 2021-08-24 DIAGNOSIS — N40.1 LOWER URINARY TRACT SYMPTOMS DUE TO BENIGN PROSTATIC HYPERPLASIA: ICD-10-CM

## 2021-08-24 DIAGNOSIS — N20.0 KIDNEY STONE: Primary | ICD-10-CM

## 2021-08-24 LAB
CULTURE: NORMAL
Lab: NORMAL
SARS-COV-2, RAPID: NOT DETECTED
SPECIMEN DESCRIPTION: NORMAL
SPECIMEN DESCRIPTION: NORMAL

## 2021-08-24 PROCEDURE — 87086 URINE CULTURE/COLONY COUNT: CPT

## 2021-08-24 PROCEDURE — 87635 SARS-COV-2 COVID-19 AMP PRB: CPT

## 2021-08-24 PROCEDURE — 2580000003 HC RX 258: Performed by: CLINICAL NURSE SPECIALIST

## 2021-08-24 PROCEDURE — 6360000002 HC RX W HCPCS: Performed by: EMERGENCY MEDICINE

## 2021-08-24 PROCEDURE — 99222 1ST HOSP IP/OBS MODERATE 55: CPT | Performed by: FAMILY MEDICINE

## 2021-08-24 PROCEDURE — 96375 TX/PRO/DX INJ NEW DRUG ADDON: CPT

## 2021-08-24 PROCEDURE — 6360000002 HC RX W HCPCS: Performed by: NURSE PRACTITIONER

## 2021-08-24 PROCEDURE — 96376 TX/PRO/DX INJ SAME DRUG ADON: CPT

## 2021-08-24 PROCEDURE — 6360000002 HC RX W HCPCS: Performed by: CLINICAL NURSE SPECIALIST

## 2021-08-24 PROCEDURE — 76937 US GUIDE VASCULAR ACCESS: CPT

## 2021-08-24 PROCEDURE — 6370000000 HC RX 637 (ALT 250 FOR IP): Performed by: CLINICAL NURSE SPECIALIST

## 2021-08-24 PROCEDURE — 6360000002 HC RX W HCPCS: Performed by: FAMILY MEDICINE

## 2021-08-24 PROCEDURE — 1200000000 HC SEMI PRIVATE

## 2021-08-24 PROCEDURE — 6370000000 HC RX 637 (ALT 250 FOR IP): Performed by: FAMILY MEDICINE

## 2021-08-24 RX ORDER — CIPROFLOXACIN 2 MG/ML
400 INJECTION, SOLUTION INTRAVENOUS EVERY 12 HOURS
Status: DISCONTINUED | OUTPATIENT
Start: 2021-08-24 | End: 2021-08-26 | Stop reason: HOSPADM

## 2021-08-24 RX ORDER — TAMSULOSIN HYDROCHLORIDE 0.4 MG/1
0.4 CAPSULE ORAL DAILY
Status: DISCONTINUED | OUTPATIENT
Start: 2021-08-24 | End: 2021-08-26 | Stop reason: HOSPADM

## 2021-08-24 RX ORDER — HYDRALAZINE HYDROCHLORIDE 20 MG/ML
10 INJECTION INTRAMUSCULAR; INTRAVENOUS EVERY 6 HOURS PRN
Status: DISCONTINUED | OUTPATIENT
Start: 2021-08-24 | End: 2021-08-25

## 2021-08-24 RX ORDER — POLYETHYLENE GLYCOL 3350 17 G/17G
17 POWDER, FOR SOLUTION ORAL DAILY PRN
Status: DISCONTINUED | OUTPATIENT
Start: 2021-08-24 | End: 2021-08-26 | Stop reason: HOSPADM

## 2021-08-24 RX ORDER — PROMETHAZINE HYDROCHLORIDE 25 MG/ML
12.5 INJECTION, SOLUTION INTRAMUSCULAR; INTRAVENOUS ONCE
Status: COMPLETED | OUTPATIENT
Start: 2021-08-24 | End: 2021-08-24

## 2021-08-24 RX ORDER — TRAZODONE HYDROCHLORIDE 50 MG/1
50 TABLET ORAL NIGHTLY
Status: DISCONTINUED | OUTPATIENT
Start: 2021-08-24 | End: 2021-08-26 | Stop reason: HOSPADM

## 2021-08-24 RX ORDER — PROMETHAZINE HYDROCHLORIDE 25 MG/ML
12.5 INJECTION, SOLUTION INTRAMUSCULAR; INTRAVENOUS EVERY 4 HOURS PRN
Status: DISCONTINUED | OUTPATIENT
Start: 2021-08-24 | End: 2021-08-26 | Stop reason: HOSPADM

## 2021-08-24 RX ORDER — SODIUM CHLORIDE 9 MG/ML
25 INJECTION, SOLUTION INTRAVENOUS PRN
Status: DISCONTINUED | OUTPATIENT
Start: 2021-08-24 | End: 2021-08-26 | Stop reason: HOSPADM

## 2021-08-24 RX ORDER — ACETAMINOPHEN 325 MG/1
650 TABLET ORAL EVERY 4 HOURS PRN
Status: DISCONTINUED | OUTPATIENT
Start: 2021-08-24 | End: 2021-08-25

## 2021-08-24 RX ORDER — SODIUM CHLORIDE 0.9 % (FLUSH) 0.9 %
5-40 SYRINGE (ML) INJECTION PRN
Status: DISCONTINUED | OUTPATIENT
Start: 2021-08-24 | End: 2021-08-26 | Stop reason: HOSPADM

## 2021-08-24 RX ORDER — ONDANSETRON 2 MG/ML
4 INJECTION INTRAMUSCULAR; INTRAVENOUS EVERY 6 HOURS PRN
Status: DISCONTINUED | OUTPATIENT
Start: 2021-08-24 | End: 2021-08-26 | Stop reason: HOSPADM

## 2021-08-24 RX ORDER — ONDANSETRON 4 MG/1
4 TABLET, ORALLY DISINTEGRATING ORAL EVERY 8 HOURS PRN
Status: DISCONTINUED | OUTPATIENT
Start: 2021-08-24 | End: 2021-08-26 | Stop reason: HOSPADM

## 2021-08-24 RX ORDER — SODIUM CHLORIDE 0.9 % (FLUSH) 0.9 %
5-40 SYRINGE (ML) INJECTION EVERY 12 HOURS SCHEDULED
Status: DISCONTINUED | OUTPATIENT
Start: 2021-08-24 | End: 2021-08-26 | Stop reason: HOSPADM

## 2021-08-24 RX ORDER — SODIUM CHLORIDE 9 MG/ML
INJECTION, SOLUTION INTRAVENOUS CONTINUOUS
Status: DISCONTINUED | OUTPATIENT
Start: 2021-08-24 | End: 2021-08-26

## 2021-08-24 RX ORDER — KETOROLAC TROMETHAMINE 30 MG/ML
15 INJECTION, SOLUTION INTRAMUSCULAR; INTRAVENOUS EVERY 6 HOURS PRN
Status: DISCONTINUED | OUTPATIENT
Start: 2021-08-24 | End: 2021-08-26 | Stop reason: HOSPADM

## 2021-08-24 RX ADMIN — HYDRALAZINE HYDROCHLORIDE 10 MG: 20 INJECTION INTRAMUSCULAR; INTRAVENOUS at 16:09

## 2021-08-24 RX ADMIN — KETOROLAC TROMETHAMINE 15 MG: 30 INJECTION, SOLUTION INTRAMUSCULAR; INTRAVENOUS at 11:18

## 2021-08-24 RX ADMIN — TAMSULOSIN HYDROCHLORIDE 0.4 MG: 0.4 CAPSULE ORAL at 13:58

## 2021-08-24 RX ADMIN — HYDROMORPHONE HYDROCHLORIDE 0.5 MG: 1 INJECTION, SOLUTION INTRAMUSCULAR; INTRAVENOUS; SUBCUTANEOUS at 06:32

## 2021-08-24 RX ADMIN — ONDANSETRON 4 MG: 2 INJECTION INTRAMUSCULAR; INTRAVENOUS at 10:52

## 2021-08-24 RX ADMIN — PROMETHAZINE HYDROCHLORIDE 12.5 MG: 25 INJECTION INTRAMUSCULAR; INTRAVENOUS at 20:01

## 2021-08-24 RX ADMIN — PROMETHAZINE HYDROCHLORIDE 12.5 MG: 25 INJECTION INTRAMUSCULAR; INTRAVENOUS at 02:39

## 2021-08-24 RX ADMIN — SODIUM CHLORIDE, PRESERVATIVE FREE 10 ML: 5 INJECTION INTRAVENOUS at 10:52

## 2021-08-24 RX ADMIN — KETOROLAC TROMETHAMINE 15 MG: 30 INJECTION, SOLUTION INTRAMUSCULAR; INTRAVENOUS at 17:10

## 2021-08-24 RX ADMIN — ONDANSETRON 4 MG: 2 INJECTION INTRAMUSCULAR; INTRAVENOUS at 00:17

## 2021-08-24 RX ADMIN — POLYETHYLENE GLYCOL 3350 17 G: 17 POWDER, FOR SOLUTION ORAL at 22:27

## 2021-08-24 RX ADMIN — ONDANSETRON 4 MG: 2 INJECTION INTRAMUSCULAR; INTRAVENOUS at 17:10

## 2021-08-24 RX ADMIN — SODIUM CHLORIDE: 9 INJECTION, SOLUTION INTRAVENOUS at 11:00

## 2021-08-24 RX ADMIN — FENTANYL CITRATE 100 MCG: 50 INJECTION, SOLUTION INTRAMUSCULAR; INTRAVENOUS at 00:01

## 2021-08-24 RX ADMIN — TRAZODONE HYDROCHLORIDE 50 MG: 50 TABLET ORAL at 22:27

## 2021-08-24 RX ADMIN — KETOROLAC TROMETHAMINE 15 MG: 15 INJECTION, SOLUTION INTRAMUSCULAR; INTRAVENOUS at 00:02

## 2021-08-24 RX ADMIN — CIPROFLOXACIN 400 MG: 2 INJECTION, SOLUTION INTRAVENOUS at 11:18

## 2021-08-24 ASSESSMENT — PAIN SCALES - GENERAL
PAINLEVEL_OUTOF10: 7
PAINLEVEL_OUTOF10: 8
PAINLEVEL_OUTOF10: 6
PAINLEVEL_OUTOF10: 5

## 2021-08-24 ASSESSMENT — PAIN DESCRIPTION - LOCATION: LOCATION: ABDOMEN;FLANK

## 2021-08-24 ASSESSMENT — PAIN DESCRIPTION - PAIN TYPE: TYPE: ACUTE PAIN

## 2021-08-24 ASSESSMENT — PAIN DESCRIPTION - ORIENTATION: ORIENTATION: RIGHT

## 2021-08-24 NOTE — ED NOTES
Report received from [facility], pt resting on stretcher without distress at present time, continue to monitor, vitals as charted, pt denies further needs or complaints at present time, awaiting transport to [facility] at [facility] per Genevieve Milton RN  08/24/21 3701

## 2021-08-24 NOTE — H&P
[facility]  Office: [address], DO, Harley Sarabia, DO, Mady Jimenezjluis, DO, Tyra Sauer Blood, DO, Marycarmen Whitehead MD, Simba Aguirre MD, Daniel Chowdary MD, Jimenez Awad MD, Wendy Loya MD, Yaya Dorantes MD, Julito Kevin MD, Basil Paula DO, Nyoka Hodgkins, MD, Olivia Ruelas DO, Jadene Boast, MD,  Tami Potts DO, Dl Walters MD, Dash Zaragoza MD, Mary Carmen Shields MD, Lisa Roblero MD, Shawna Phan MD, Antonio Carlos MD, Serene Vincent MD, Jessica Choi CNP, [facility], CNP, Richard Ortiz, CNP, Ran Bowen, CNS, Maylin Rear, CNP, Nancy Nava, CNP, Jean Carlos Foot, CNP, Raj Gamma, CNP, Alena Loser, CNP, Keerthi Nelson PA-C, Sonia Manning, JACK, Chris Saunders, CNP, Rubia Wilhelm, CNP, Gabriella Lane, CNP, Nomi Levi, CNP, Rosemary Neumann, CNP, Alonzo Parish, CNP, Estephania Chandler, CNP, Richar Gupta, CNP         [address]    HISTORY AND PHYSICAL EXAMINATION            Date:   8/24/2021  Patient name:  Sudha Hernandez  Date of admission:  8/24/2021  9:55 AM  MRN:   [id number]  Account:  [de-identified]  YOB: 1955  PCP:    Nancy Rivera MD  Room:   8549/7127-07  Code Status:    Full Code    Chief Complaint:     Right flank pain        History Obtained From:     patient    History of Present Illness: This is a 50year old male patient with a history of right sided kidney stone s/p ESWL. Presented with a chief complaint of severe right sided flank pain since yesterday. Associated with nausea and vomiting. Transferred from neighboring facility for urology evaluation. CT abdomen pelvis from 8/23/2021 showed right-sided obstructive uropathy with a 5 mm stone in the mid right ureter. Currently in bed complaining of 10/10 right sided flank pain with nausea.  No other complaints at this time     Past Medical History:     Past Medical History:   Diagnosis Date    Chronic pain of right knee 12/5/2019    Depression     Diverticulitis     H/O right flank pain     Kidney stones     Knee pain     Night sweats     Osteoarthritis     Chronic    Tooth loose     rt front upper with plastic cap    Wears glasses     reading    Weight loss         Past Surgical History:     Past Surgical History:   Procedure Laterality Date    CARPAL TUNNEL RELEASE Bilateral     1990's    COLONOSCOPY N/A 1/6/2020    COLONOSCOPY WITH BIOPSY performed by Manuel Aguilar MD at [place] [address] Right     KNEE ARTHROSCOPY Right     multiple times    LITHOTRIPSY Right 8/18/2021    ESWL EXTRACORPOREAL SHOCK WAVE LITHOTRIPSY performed by José Velasco MD at [address] [place] [address] Left         Medications Prior to Admission:     Prior to Admission medications    Medication Sig Start Date End Date Taking? Authorizing Provider   traMADol (ULTRAM) 50 MG tablet Take 50 mg by mouth every 6 hours as needed for Pain. Historical Provider, MD   tamsulosin (FLOMAX) 0.4 MG capsule TAKE 1 CAPSULE BY MOUTH EVERY DAY 5/8/21   Michaelle Kim MD   celecoxib (CELEBREX) 200 MG capsule Take 1 capsule by mouth 2 times daily 3/29/21   Michaelle Kim MD   Blood Pressure KIT 1 Units by Does not apply route daily 2/25/21   Michaelle Kim MD        Allergies:     Codeine and Hydrocodone    Social History:     Tobacco:    reports that he has never smoked. He has never used smokeless tobacco.  Alcohol:      reports previous alcohol use. Drug Use:  reports no history of drug use.     Family History:     Family History   Problem Relation Age of Onset    No Known Problems Mother     Cancer Father     Diabetes type 2  Father     COPD Brother     No Known Problems Maternal Grandmother     No Known Problems Maternal Grandfather     No Known Problems Paternal Grandmother     No Known Problems Paternal Grandfather     Obesity Maternal Cousin     Heart Attack Brother        Review of Systems:     Positive and Negative as described in HPI. 12 point ROS performed and negative for anything other than what was stated in subjective     Physical Exam:   /81   Pulse 57   Temp 98.4 °F (36.9 °C) (Oral)   Resp 16   SpO2 97%   Temp (24hrs), Av.9 °F (36.6 °C), Min:97.4 °F (36.3 °C), Max:98.4 °F (36.9 °C)    No results for input(s): POCGLU in the last 72 hours.     Intake/Output Summary (Last 24 hours) at 2021 1117  Last data filed at 2021 1109  Gross per 24 hour   Intake --   Output 125 ml   Net -125 ml       General Appearance: alert, well appearing, and in no acute distress  Mental status: oriented to person, place, and time  Head: normocephalic, atraumatic  Eye: no icterus, redness, pupils equal and reactive, extraocular eye movements intact, conjunctiva clear  Ear: normal external ear, no discharge, hearing intact  Nose: no drainage noted  Mouth: mucous membranes moist  Neck: supple, no carotid bruits, thyroid not palpable  Lungs: Bilateral equal air entry, clear to ausculation, no wheezing, rales or rhonchi, normal effort  Cardiovascular: normal rate, regular rhythm, no murmur, gallop, rub  Abdomen: right sided flank pain   Neurologic: There are no new focal motor or sensory deficits, normal muscle tone and bulk, no abnormal sensation, normal speech, cranial nerves II through XII grossly intact  Skin: No gross lesions, rashes, bruising or bleeding on exposed skin area  Extremities: peripheral pulses palpable, no pedal edema or calf pain with palpation  Psych: normal affect    Investigations:      Laboratory Testing:  Recent Results (from the past 24 hour(s))   CBC Auto Differential    Collection Time: 21  3:21 PM   Result Value Ref Range    WBC 9.4 3.5 - 11.0 k/uL    RBC 5.42 4.5 - 5.9 m/uL    Hemoglobin 16.4 13.5 - 17.5 g/dL    Hematocrit 49.5 41 - 53 %    MCV 91.3 80 - 100 fL    MCH 30.3 26 - 34 pg    MCHC 33.2 31 - 37 g/dL    RDW 14.0 12.5 - 15.4 %    Platelets 552 625 - 238 k/uL    MPV 7.5 6.0 - 12.0 fL NRBC Automated NOT REPORTED per 100 WBC    Differential Type NOT REPORTED     Seg Neutrophils 79 (H) 36 - 66 %    Lymphocytes 13 (L) 24 - 44 %    Monocytes 6 2 - 11 %    Eosinophils % 1 1 - 4 %    Basophils 1 0 - 2 %    Immature Granulocytes NOT REPORTED 0 %    Segs Absolute 7.50 1.8 - 7.7 k/uL    Absolute Lymph # 1.20 1.0 - 4.8 k/uL    Absolute Mono # 0.60 0.1 - 1.2 k/uL    Absolute Eos # 0.10 0.0 - 0.4 k/uL    Basophils Absolute 0.10 0.0 - 0.2 k/uL    Absolute Immature Granulocyte NOT REPORTED 0.00 - 0.30 k/uL    WBC Morphology NOT REPORTED     RBC Morphology NOT REPORTED     Platelet Estimate NOT REPORTED    Basic Metabolic Panel w/ Reflex to MG    Collection Time: 08/23/21  3:21 PM   Result Value Ref Range    Glucose 140 (H) 70 - 99 mg/dL    BUN 22 8 - 23 mg/dL    CREATININE 1.40 (H) 0.70 - 1.20 mg/dL    Bun/Cre Ratio NOT REPORTED 9 - 20    Calcium 10.0 8.6 - 10.4 mg/dL    Sodium 143 135 - 144 mmol/L    Potassium 3.8 3.7 - 5.3 mmol/L    Chloride 106 98 - 107 mmol/L    CO2 23 20 - 31 mmol/L    Anion Gap 14 9 - 17 mmol/L    GFR Non-African American 51 (L) >60 mL/min    GFR African American >60 >60 mL/min    GFR Comment          GFR Staging NOT REPORTED    Urinalysis Reflex to Culture    Collection Time: 08/23/21  5:13 PM    Specimen: Urine, clean catch   Result Value Ref Range    Color, UA YELLOW YELLOW    Turbidity UA CLEAR CLEAR    Glucose, Ur 1+ (A) NEGATIVE    Bilirubin Urine NEGATIVE NEGATIVE    Ketones, Urine SMALL (A) NEGATIVE    Specific Gravity, UA 1.027 1.005 - 1.030    Urine Hgb LARGE (A) NEGATIVE    pH, UA 6.5 5.0 - 8.0    Protein, UA 1+ (A) NEGATIVE    Urobilinogen, Urine Normal Normal    Nitrite, Urine NEGATIVE NEGATIVE    Leukocyte Esterase, Urine NEGATIVE NEGATIVE    Urinalysis Comments NOT REPORTED    Microscopic Urinalysis    Collection Time: 08/23/21  5:13 PM   Result Value Ref Range    -          WBC, UA 2 TO 5 0 - 5 /HPF    RBC, UA 20 TO 50 0 - 2 /HPF    Casts UA NOT REPORTED 0 - 2 /LPF Crystals, UA NOT REPORTED None /HPF    Epithelial Cells UA 2 TO 5 0 - 5 /HPF    Renal Epithelial, UA NOT REPORTED 0 /HPF    Bacteria, UA None None    Mucus, UA NOT REPORTED None    Trichomonas, UA NOT REPORTED None    Amorphous, UA NOT REPORTED None    Other Observations UA (A) NOT REQ. Utilizing a urinalysis as the only screening method to exclude a potential uropathogen can be unreliable in many patient populations. Rapid screening tests are less sensitive than culture and if UTI is a clinical possibility, culture should be considered despite a negative urinalysis. Yeast, UA NOT REPORTED None       Imaging/Diagnostics:  CT ABDOMEN PELVIS WO CONTRAST Additional Contrast? None    Result Date: 8/23/2021  Right-sided obstructive uropathy with a 5 mm stone in the right mid ureter. Assessment :      Hospital Problems         Last Modified POA    Kidney stone (Chronic) 8/23/2021 Yes          Plan:     Patient status inpatient in the Med/Surge    1. Rt ureteral stone w/ hydroureteronephrosis   - urology consulted   - NPO   - will strain all urine   - continue flomax  - pain control   - IVF   - v/s per unit protocol     Consultations:   [address] TO IV TEAM      Patient is admitted as inpatient status because of co-morbidities listed above, severity of signs and symptoms as outlined, requirement for current medical therapies and most importantly because of direct risk to patient if care not provided in a hospital setting. Expected length of stay > 48 hours.     Yosef Vieira MD  8/24/2021  11:17 AM    Copy sent to Dr. Keyana Maxwell MD

## 2021-08-24 NOTE — ED NOTES
Report to [facility] RN for room 324 phone [phone].      Andrea Raymundo RN  08/24/21 7173

## 2021-08-24 NOTE — PROGRESS NOTES
Pt strongly advised if he does not feel well or weak that a shower may not be in his best interest or safety. Pt is alert and oriented.

## 2021-08-24 NOTE — CONSULTS
Ester Campos, [address] [facility], [address], Ethelyn Angelucci, Tano Siu. Urology Consult      Patient:  Edmundo Ceron  MRN: [id number]  YOB: 1955    CHIEF COMPLAINT: Right flank pain, nausea, vomiting    HISTORY OF PRESENT ILLNESS:   The patient is a 77 y.o. male with a history of right kidney stone status post ESWL, presented to the ED with right flank pain, nausea, vomiting. Patient had a right lower pole stone measuring 7 mm found on CT scan, patient underwent right extracorporeal shockwave lithotripsy on 8/18/2010 and 21. Yesterday early a.m. patient presented to the emergency department with intractable nausea vomiting as well as right flank pain. Patient was transferred to [facility].  Currently patient denies any fevers but endorses chills. He endorses nausea, and vomited 7 times in the past day. He denies any dysuria or hematuria. Endorses right flank pain. Patient last ate or drank yesterday at 3 AM.  He is not on any blood thinners. Creatinine 1.40 (baseline 1.0), WBC 9.4, Hgb 16.4. UA from yesterday negative for nitrites or LEs. T 98.4, RR 16, pulse 57, /81, satting 97% on room air. CT abdomen pelvis from 8/23/2021 showed right-sided obstructive uropathy with a 5 mm stone in the mid right ureter. Patient's old records, notes and chart reviewed and summarized above.     Past Medical History:    Past Medical History:   Diagnosis Date    Chronic pain of right knee 12/5/2019    Depression     Diverticulitis     H/O right flank pain     Kidney stones     Knee pain     Night sweats     Osteoarthritis     Chronic    Tooth loose     rt front upper with plastic cap    Wears glasses     reading    Weight loss        Past Surgical History:    Past Surgical History:   Procedure Laterality Date    CARPAL TUNNEL RELEASE Bilateral     1990's    COLONOSCOPY N/A 1/6/2020    COLONOSCOPY WITH BIOPSY performed by Berta Lujan MD at [place] [address] REPLACEMENT Right     KNEE ARTHROSCOPY Right     multiple times    LITHOTRIPSY Right 8/18/2021    ESWL EXTRACORPOREAL SHOCK WAVE LITHOTRIPSY performed by Breonna Lacy MD at [address] Left        Medications:      Current Facility-Administered Medications:     tamsulosin (FLOMAX) capsule 0.4 mg, 0.4 mg, Oral, Daily, Cornelius Shelling, APRN - CNS    0.9 % sodium chloride infusion, , Intravenous, Continuous, Cornelius Shelling, APRN - CNS    sodium chloride flush 0.9 % injection 5-40 mL, 5-40 mL, Intravenous, 2 times per day, Cornelius Shelling, APRN - CNS    sodium chloride flush 0.9 % injection 5-40 mL, 5-40 mL, Intravenous, PRN, Cornelius Shelling, APRN - CNS    0.9 % sodium chloride infusion, 25 mL, Intravenous, PRN, Cornelius Shelling, APRN - CNS    acetaminophen (TYLENOL) tablet 650 mg, 650 mg, Oral, Q4H PRN, Cornelius Shelling, APRN - CNS    ondansetron (ZOFRAN-ODT) disintegrating tablet 4 mg, 4 mg, Oral, Q8H PRN **OR** ondansetron (ZOFRAN) injection 4 mg, 4 mg, Intravenous, Q6H PRN, Cornelius Shelling, APRN - CNS    polyethylene glycol (GLYCOLAX) packet 17 g, 17 g, Oral, Daily PRN, Cornelius Shelling, APRN - CNS    ciprofloxacin (CIPRO) IVPB 400 mg, 400 mg, Intravenous, Q12H, Cornelius Shelling, APRN - CNS    Allergies:     Allergies   Allergen Reactions    Codeine      Uncomfortable feeling- sea sick feeling    Hydrocodone Nausea Only       Social History:   Social History     Socioeconomic History    Marital status:      Spouse name: Not on file    Number of children: Not on file    Years of education: Not on file    Highest education level: Not on file   Occupational History    Occupation: factory     Employer: [employer]   Tobacco Use    Smoking status: Never Smoker    Smokeless tobacco: Never Used   Vaping Use    Vaping Use: Never used   Substance and Sexual Activity    Alcohol use: Not Currently     Alcohol/week: 0.0 standard drinks    Drug use: Never    Sexual activity: Not Currently   Other Topics Concern    Not on file   Social History Narrative    Not on file     Social Determinants of Health     Financial Resource Strain: Low Risk     Difficulty of Paying Living Expenses: Not hard at all   Food Insecurity: No Food Insecurity    Worried About Running Out of Food in the Last Year: Never true    [address] in the Last Year: Never true   Transportation Needs: No Transportation Needs    Lack of Transportation (Medical): No    Lack of Transportation (Non-Medical): No   Physical Activity:     Days of Exercise per Week:     Minutes of Exercise per Session:    Stress:     Feeling of Stress :    Social Connections:     Frequency of Communication with Friends and Family:     Frequency of Social Gatherings with Friends and Family:     Attends Denominational Services:     Active Member of Clubs or Organizations:     Attends Club or Organization Meetings:     Marital Status:    Intimate Partner Violence:     Fear of Current or Ex-Partner:     Emotionally Abused:     Physically Abused:     Sexually Abused:        Family History:    Family History   Problem Relation Age of Onset    No Known Problems Mother     Cancer Father     Diabetes type 2  Father     COPD Brother     No Known Problems Maternal Grandmother     No Known Problems Maternal Grandfather     No Known Problems Paternal Grandmother     No Known Problems Paternal Grandfather     Obesity Maternal Cousin     Heart Attack Brother        REVIEW OF SYSTEMS:  A comprehensive 14 point review of systems was obtained. Constitutional: No fatigue  Eyes: No blurry vision  Ears, nose, mouth, throat, face: No ringing in the ears; no facial droop. Respiratory: No cough or cold. Cardiovascular: No palpitations  Gastrointestinal: No diarrhea or constipation.   Genitourinary: No burning with urination  Integument/Skin: No rashes        Physical Exam:    This a 77 y.o. male   Vitals:    08/24/21 1022   BP: 121/81 Pulse: 57   Resp: 16   Temp: 98.4 °F (36.9 °C)   SpO2: 97%     Constitutional: Patient in no acute distress. Neuro: alert and oriented to person place and time. Psych: Mood and affect normal.   Head: Atraumatic and normocephalic. Neck: Trachea midline   Skin: No rashes or bruising present. Lungs: Respiratory effort normal.  Cardiovascular:  Heart rate regular. Positive radial pulses bilaterally  Abdomen: Soft, non-tender, non-distended. Neither side has CVA tenderness on exam.  Bladder non-tender and not distended. Extremities: No bilateral lower extremity edema    Labs:  Recent Labs     08/23/21  1521   WBC 9.4   HGB 16.4   HCT 49.5   MCV 91.3        Recent Labs     08/23/21  1521      K 3.8      CO2 23   BUN 22   CREATININE 1.40*       Recent Labs     08/23/21  1713   COLORU YELLOW   PHUR 6.5   WBCUA 2 TO 5   RBCUA 20 TO 50   MUCUS NOT REPORTED   TRICHOMONAS NOT REPORTED   YEAST NOT REPORTED   BACTERIA None   SPECGRAV 1.027   LEUKOCYTESUR NEGATIVE   UROBILINOGEN Normal   BILIRUBINUR NEGATIVE       Culture Results:      -----------------------------------------------------------------  Imaging Results:  CT A/P (8/23/2021): Right-sided obstructive uropathy with a 5 mm stone in the right mid ureter. Assessment and Plan   Impression:    Right mid ureteral 5 mm stone with hydroureteronephrosis after ESWL for 7 mm right lower pole stone      Plan:   NPO  IV fluids  Strain all urine  Continue flomax  Pain meds and antiemetics PRN      Thank you for involving us in the care of Horacio Solis. Should you have any questions, please do not hesitate to contact us at any time. Aravind Hansen MD  Urology Resident, PGY-2  10:09 AM 8/24/2021

## 2021-08-24 NOTE — CARE COORDINATION
Case Management Initial Discharge Plan  [facility],             Met with:patient to discuss discharge plans. Information verified: address, contacts, phone number, , insurance Yes  Insurance Provider: Sonia Andrade    Emergency Contact/Next of Kin name & number: daughter Evangelist Mendoza  Who are involved in patient's support system? Family     PCP: Zoey Putnam MD  Date of last visit: last month      Discharge Planning    Living Arrangements:  [address] has 2 stories (split level)  7 stairs to climb to get into front door,     Patient able to perform ADL's:Independent    Current Services (outpatient & in home) none  DME equipment: none      Is patient receiving oral anticoagulation therapy? No          Potential Assistance Needed:  F/u pcp     Evaluation: no    Expected Discharge date:  21    Patient expects to be discharged to:   home    Follow Up Appointment: Best Day/ Time: Monday AM    Transportation provider: Josephine Cabrera  Transportation arrangements needed for discharge: No    Readmission Risk              Risk of Unplanned Readmission:  6             Does patient have a readmission risk score greater than 14?: No  If yes, follow-up appointment must be made within 7 days of discharge.      Goals of Care: safe transition plan       Educated yes on transitional options, provided freedom of choice and are agreeable with plan      Discharge Plan: return home independently           Electronically signed by Shravan Syed RN on 21 at 12:17 PM EDT

## 2021-08-25 ENCOUNTER — ANESTHESIA EVENT (OUTPATIENT)
Dept: OPERATING ROOM | Age: 66
DRG: 661 | End: 2021-08-25
Payer: MEDICARE

## 2021-08-25 LAB
ANION GAP SERPL CALCULATED.3IONS-SCNC: 12 MMOL/L (ref 9–17)
ANION GAP SERPL CALCULATED.3IONS-SCNC: 14 MMOL/L (ref 9–17)
BUN BLDV-MCNC: 14 MG/DL (ref 8–23)
BUN BLDV-MCNC: 19 MG/DL (ref 8–23)
BUN/CREAT BLD: ABNORMAL (ref 9–20)
BUN/CREAT BLD: ABNORMAL (ref 9–20)
CALCIUM SERPL-MCNC: 8.3 MG/DL (ref 8.6–10.4)
CALCIUM SERPL-MCNC: 8.9 MG/DL (ref 8.6–10.4)
CHLORIDE BLD-SCNC: 102 MMOL/L (ref 98–107)
CHLORIDE BLD-SCNC: 104 MMOL/L (ref 98–107)
CO2: 20 MMOL/L (ref 20–31)
CO2: 25 MMOL/L (ref 20–31)
CREAT SERPL-MCNC: 0.99 MG/DL (ref 0.7–1.2)
CREAT SERPL-MCNC: 1.15 MG/DL (ref 0.7–1.2)
CULTURE: NO GROWTH
GFR AFRICAN AMERICAN: >60 ML/MIN
GFR AFRICAN AMERICAN: >60 ML/MIN
GFR NON-AFRICAN AMERICAN: >60 ML/MIN
GFR NON-AFRICAN AMERICAN: >60 ML/MIN
GFR SERPL CREATININE-BSD FRML MDRD: ABNORMAL ML/MIN/{1.73_M2}
GLUCOSE BLD-MCNC: 112 MG/DL (ref 70–99)
GLUCOSE BLD-MCNC: 116 MG/DL (ref 70–99)
HCT VFR BLD CALC: 45.1 % (ref 40.7–50.3)
HEMOGLOBIN: 15 G/DL (ref 13–17)
Lab: NORMAL
MAGNESIUM: 2.1 MG/DL (ref 1.6–2.6)
MCH RBC QN AUTO: 30.2 PG (ref 25.2–33.5)
MCHC RBC AUTO-ENTMCNC: 33.3 G/DL (ref 28.4–34.8)
MCV RBC AUTO: 90.7 FL (ref 82.6–102.9)
NRBC AUTOMATED: 0 PER 100 WBC
PDW BLD-RTO: 13.4 % (ref 11.8–14.4)
PLATELET # BLD: 231 K/UL (ref 138–453)
PMV BLD AUTO: 10 FL (ref 8.1–13.5)
POTASSIUM SERPL-SCNC: 3.4 MMOL/L (ref 3.7–5.3)
POTASSIUM SERPL-SCNC: 4 MMOL/L (ref 3.7–5.3)
RBC # BLD: 4.97 M/UL (ref 4.21–5.77)
SODIUM BLD-SCNC: 138 MMOL/L (ref 135–144)
SODIUM BLD-SCNC: 139 MMOL/L (ref 135–144)
SPECIMEN DESCRIPTION: NORMAL
WBC # BLD: 11.9 K/UL (ref 3.5–11.3)

## 2021-08-25 PROCEDURE — 36415 COLL VENOUS BLD VENIPUNCTURE: CPT

## 2021-08-25 PROCEDURE — 6370000000 HC RX 637 (ALT 250 FOR IP): Performed by: CLINICAL NURSE SPECIALIST

## 2021-08-25 PROCEDURE — 6370000000 HC RX 637 (ALT 250 FOR IP): Performed by: STUDENT IN AN ORGANIZED HEALTH CARE EDUCATION/TRAINING PROGRAM

## 2021-08-25 PROCEDURE — 83735 ASSAY OF MAGNESIUM: CPT

## 2021-08-25 PROCEDURE — 85027 COMPLETE CBC AUTOMATED: CPT

## 2021-08-25 PROCEDURE — 6360000002 HC RX W HCPCS: Performed by: CLINICAL NURSE SPECIALIST

## 2021-08-25 PROCEDURE — 1200000000 HC SEMI PRIVATE

## 2021-08-25 PROCEDURE — 6360000002 HC RX W HCPCS: Performed by: STUDENT IN AN ORGANIZED HEALTH CARE EDUCATION/TRAINING PROGRAM

## 2021-08-25 PROCEDURE — 2500000003 HC RX 250 WO HCPCS: Performed by: STUDENT IN AN ORGANIZED HEALTH CARE EDUCATION/TRAINING PROGRAM

## 2021-08-25 PROCEDURE — 80048 BASIC METABOLIC PNL TOTAL CA: CPT

## 2021-08-25 PROCEDURE — 6370000000 HC RX 637 (ALT 250 FOR IP): Performed by: FAMILY MEDICINE

## 2021-08-25 PROCEDURE — 99232 SBSQ HOSP IP/OBS MODERATE 35: CPT | Performed by: STUDENT IN AN ORGANIZED HEALTH CARE EDUCATION/TRAINING PROGRAM

## 2021-08-25 PROCEDURE — 6360000002 HC RX W HCPCS: Performed by: NURSE PRACTITIONER

## 2021-08-25 RX ORDER — HYDRALAZINE HYDROCHLORIDE 20 MG/ML
10 INJECTION INTRAMUSCULAR; INTRAVENOUS EVERY 4 HOURS PRN
Status: DISCONTINUED | OUTPATIENT
Start: 2021-08-25 | End: 2021-08-26 | Stop reason: HOSPADM

## 2021-08-25 RX ORDER — ACETAMINOPHEN 500 MG
1000 TABLET ORAL EVERY 8 HOURS SCHEDULED
Status: DISCONTINUED | OUTPATIENT
Start: 2021-08-25 | End: 2021-08-26 | Stop reason: HOSPADM

## 2021-08-25 RX ORDER — OXYCODONE HYDROCHLORIDE 5 MG/1
5 TABLET ORAL EVERY 4 HOURS PRN
Status: DISCONTINUED | OUTPATIENT
Start: 2021-08-25 | End: 2021-08-26 | Stop reason: HOSPADM

## 2021-08-25 RX ORDER — LABETALOL HYDROCHLORIDE 5 MG/ML
10 INJECTION, SOLUTION INTRAVENOUS EVERY 4 HOURS PRN
Status: DISCONTINUED | OUTPATIENT
Start: 2021-08-25 | End: 2021-08-26 | Stop reason: HOSPADM

## 2021-08-25 RX ORDER — LORAZEPAM 0.5 MG/1
0.5 TABLET ORAL EVERY 4 HOURS PRN
Status: DISCONTINUED | OUTPATIENT
Start: 2021-08-25 | End: 2021-08-26 | Stop reason: HOSPADM

## 2021-08-25 RX ORDER — AMLODIPINE BESYLATE 5 MG/1
5 TABLET ORAL DAILY
Status: DISCONTINUED | OUTPATIENT
Start: 2021-08-25 | End: 2021-08-26 | Stop reason: HOSPADM

## 2021-08-25 RX ADMIN — TAMSULOSIN HYDROCHLORIDE 0.4 MG: 0.4 CAPSULE ORAL at 09:08

## 2021-08-25 RX ADMIN — HYDRALAZINE HYDROCHLORIDE 10 MG: 20 INJECTION INTRAMUSCULAR; INTRAVENOUS at 13:32

## 2021-08-25 RX ADMIN — POLYETHYLENE GLYCOL 3350 17 G: 17 POWDER, FOR SOLUTION ORAL at 09:07

## 2021-08-25 RX ADMIN — LORAZEPAM 0.5 MG: 0.5 TABLET ORAL at 15:11

## 2021-08-25 RX ADMIN — LORAZEPAM 0.5 MG: 0.5 TABLET ORAL at 19:18

## 2021-08-25 RX ADMIN — ACETAMINOPHEN 1000 MG: 500 TABLET ORAL at 21:46

## 2021-08-25 RX ADMIN — TRAZODONE HYDROCHLORIDE 50 MG: 50 TABLET ORAL at 21:46

## 2021-08-25 RX ADMIN — CIPROFLOXACIN 400 MG: 2 INJECTION, SOLUTION INTRAVENOUS at 00:16

## 2021-08-25 RX ADMIN — AMLODIPINE BESYLATE 5 MG: 5 TABLET ORAL at 10:25

## 2021-08-25 RX ADMIN — ACETAMINOPHEN 1000 MG: 500 TABLET ORAL at 15:11

## 2021-08-25 RX ADMIN — CIPROFLOXACIN 400 MG: 2 INJECTION, SOLUTION INTRAVENOUS at 10:25

## 2021-08-25 RX ADMIN — PROMETHAZINE HYDROCHLORIDE 12.5 MG: 25 INJECTION INTRAMUSCULAR; INTRAVENOUS at 09:08

## 2021-08-25 RX ADMIN — Medication 10 MG: at 21:46

## 2021-08-25 RX ADMIN — Medication 10 MG: at 15:11

## 2021-08-25 RX ADMIN — CIPROFLOXACIN 400 MG: 2 INJECTION, SOLUTION INTRAVENOUS at 21:47

## 2021-08-25 RX ADMIN — HYDRALAZINE HYDROCHLORIDE 10 MG: 20 INJECTION INTRAMUSCULAR; INTRAVENOUS at 17:31

## 2021-08-25 ASSESSMENT — PAIN SCALES - GENERAL
PAINLEVEL_OUTOF10: 3
PAINLEVEL_OUTOF10: 6

## 2021-08-25 NOTE — PLAN OF CARE
Problem: Falls - Risk of:  Goal: Will remain free from falls  Description: Will remain free from falls  8/25/2021 0206 by Sanjiv England  Outcome: Ongoing  8/24/2021 1814 by Emilie Christopher RN  Outcome: Ongoing  Goal: Absence of physical injury  Description: Absence of physical injury  8/25/2021 0206 by Sanjiv England  Outcome: Ongoing  8/24/2021 1814 by Emilie Christopher RN  Outcome: Ongoing     Problem: Pain:  Goal: Pain level will decrease  Description: Pain level will decrease  Outcome: Ongoing  Goal: Control of acute pain  Description: Control of acute pain  Outcome: Ongoing  Goal: Control of chronic pain  Description: Control of chronic pain  Outcome: Ongoing

## 2021-08-25 NOTE — ANESTHESIA PRE PROCEDURE
0.9 % injection 5-40 mL  5-40 mL IntraVENous PRN Niesha Schwab, APRN - CNS        0.9 % sodium chloride infusion  25 mL IntraVENous PRN Niesha Schwab, APRN - CNS        ondansetron (ZOFRAN-ODT) disintegrating tablet 4 mg  4 mg Oral Q8H PRN Niesha Schwab, APRN - CNS        Or    ondansetron (ZOFRAN) injection 4 mg  4 mg IntraVENous Q6H PRN Niesha Schwab, APRN - CNS   4 mg at 08/24/21 1710    polyethylene glycol (GLYCOLAX) packet 17 g  17 g Oral Daily PRN Niesha Schwab, APRN - CNS   17 g at 08/25/21 0907    ciprofloxacin (CIPRO) IVPB 400 mg  400 mg IntraVENous Q12H Niesha Schwab, APRN - CNS   Stopped at 08/25/21 1125    ketorolac (TORADOL) injection 15 mg  15 mg IntraVENous Q6H PRN Maine Retana MD   15 mg at 08/24/21 1710    traZODone (DESYREL) tablet 50 mg  50 mg Oral Nightly Maine Retana MD   50 mg at 08/24/21 2227    promethazine (PHENERGAN) injection 12.5 mg  12.5 mg Intramuscular Q4H PRN MAKSIM Lovett - CNP   12.5 mg at 08/25/21 8137       Allergies:     Allergies   Allergen Reactions    Codeine      Uncomfortable feeling- sea sick feeling    Hydrocodone Nausea Only       Problem List:    Patient Active Problem List   Diagnosis Code    Unilateral primary osteoarthritis, right knee M17.11    Depression F32.9    Elevated BP without diagnosis of hypertension R03.0    Benign prostatic hyperplasia without urinary obstruction N40.0    Lower urinary tract symptoms due to benign prostatic hyperplasia N40.1    Urge urinary incontinence N39.41    Kidney stone N20.0       Past Medical History:        Diagnosis Date    Chronic pain of right knee 12/5/2019    Depression     Diverticulitis     H/O right flank pain     Kidney stones     Knee pain     Night sweats     Osteoarthritis     Chronic    Tooth loose     rt front upper with plastic cap    Wears glasses     reading    Weight loss        Past Surgical History:        Procedure Laterality Date    CARPAL TUNNEL RELEASE Tests: No results for input(s): POCGLU, POCNA, POCK, POCCL, POCBUN, POCHEMO, POCHCT in the last 72 hours. Coags: No results found for: PROTIME, INR, APTT    HCG (If Applicable): No results found for: PREGTESTUR, PREGSERUM, HCG, HCGQUANT     ABGs: No results found for: PHART, PO2ART, WST1QXV, KQI2LQK, BEART, U7OTTBQK     Type & Screen (If Applicable):  No results found for: LABABO, LABRH    Drug/Infectious Status (If Applicable):  Lab Results   Component Value Date    HEPCAB NONREACTIVE 11/19/2019       COVID-19 Screening (If Applicable):   Lab Results   Component Value Date    COVID19 Not Detected 08/24/2021           Anesthesia Evaluation  Patient summary reviewed and Nursing notes reviewed  Airway: Mallampati: II  TM distance: >3 FB   Neck ROM: full  Mouth opening: > = 3 FB Dental:          Pulmonary:Negative Pulmonary ROS and normal exam                               Cardiovascular:Negative CV ROS          ECG reviewed  Rhythm: regular  Rate: normal                    Neuro/Psych:   (+) psychiatric history:depression/anxiety             GI/Hepatic/Renal: Neg GI/Hepatic/Renal ROS            Endo/Other: Negative Endo/Other ROS                    Abdominal:             Vascular: negative vascular ROS. Other Findings:           Anesthesia Plan      general     ASA 2       Induction: intravenous. Anesthetic plan and risks discussed with patient. Plan discussed with LUCAS.                 MAKSIM Solano Cea - LUCAS   8/25/2021

## 2021-08-25 NOTE — PROGRESS NOTES
[facility]  Office: [address], DO, Kyleigh Dodson, DO, Joe Watt, DO, Heydi Flores, DO, Ra Owens MD, Ovidio Chung MD, Gamal Lovett MD, Tyesha Hall MD, Taryn Tellez MD, Joy Mathis MD, Catarina Watt MD, Jenniffer Castillo, DO, Franciso Schaumann, MD, Rosalba Brand DO, Damián Akbar MD,  Maki Gold DO, Cata Blair MD, Denman Najjar, MD, Tyler Garza MD, Michael Vasquez MD, Lynn Rubio MD, Naomi Paul MD, Papo Aragno MD, Jenniffer Kyle, [facility], [facility], CNP, Jerrod Ha, CNP, Natalie Lombardo, CNS, Otto Pickett, CNP, Vladislav West, CNP, Roselia Alba, CNP, Evelin Bhandari, CNP, Jayla Wilson, CNP, JOSE YorkC, Qamar Araya, [facility], Camille Black, CNP, Elton Hair, CNP, Luz Childers, CNP, Milton Echavarria, CNP, Iker Miles, CNP, Cheryl Carlin, CNP, Abdulkadir Maravilla, CNP, Dariusz Marquez, [address] [place] [address]    Progress Note    8/25/2021    9:15 AM    Name:   Flash Power  MRN:     [id number]     Acct:      [de-identified]   Room:   [address] [place] [address] Day:  1  Admit Date:  8/24/2021  9:55 AM    PCP:   Beverley Orona MD  Code Status:  Full Code    Subjective:     C/C: No chief complaint on file. abdominal pain  Interval History Status: not changed. paitent seen and examined. Some blaoting and general unease, otherwise doing ok.  Ambulating to the bathroom, urinated 200 mL    Brief History:     77year old male past medical history of kidney stones and HTN presents with flank pain and being treated for nephrolithiasis on cipro and flomax with plans for cystoscopy on 8/26/2021    Review of Systems:     Constitutional:  negative for chills, fevers, sweats  Respiratory:  negative for cough, dyspnea on exertion, shortness of breath, wheezing  Cardiovascular:  negative for chest pain, chest pressure/discomfort, lower extremity edema, palpitations  Gastrointestinal:  negative for abdominal pain, constipation, diarrhea, nausea, vomiting  Neurological:  negative for dizziness, headache    Medications: Allergies: Allergies   Allergen Reactions    Codeine      Uncomfortable feeling- sea sick feeling    Hydrocodone Nausea Only       Current Meds:   Scheduled Meds:    tamsulosin  0.4 mg Oral Daily    sodium chloride flush  5-40 mL IntraVENous 2 times per day    ciprofloxacin  400 mg IntraVENous Q12H    traZODone  50 mg Oral Nightly     Continuous Infusions:    sodium chloride 100 mL/hr at 21 1100    sodium chloride       PRN Meds: sodium chloride flush, sodium chloride, acetaminophen, ondansetron **OR** ondansetron, polyethylene glycol, ketorolac, hydrALAZINE, promethazine    Data:     Past Medical History:   has a past medical history of Chronic pain of right knee, Depression, Diverticulitis, H/O right flank pain, Kidney stones, Knee pain, Night sweats, Osteoarthritis, Tooth loose, Wears glasses, and Weight loss. Social History:   reports that he has never smoked. He has never used smokeless tobacco. He reports previous alcohol use. He reports that he does not use drugs. Family History:   Family History   Problem Relation Age of Onset    No Known Problems Mother     Cancer Father     Diabetes type 2  Father     COPD Brother     No Known Problems Maternal Grandmother     No Known Problems Maternal Grandfather     No Known Problems Paternal Grandmother     No Known Problems Paternal Grandfather     Obesity Maternal Cousin     Heart Attack Brother        Vitals:  BP (!) 172/72   Pulse 66   Temp 98.2 °F (36.8 °C) (Oral)   Resp 14   Ht 5' 9\" (1.753 m)   Wt 230 lb (104.3 kg) Comment: per pt  SpO2 95%   BMI 33.97 kg/m²   Temp (24hrs), Av.9 °F (36.6 °C), Min:97.7 °F (36.5 °C), Max:98.4 °F (36.9 °C)    No results for input(s): POCGLU in the last 72 hours. I/O (24Hr):     Intake/Output Summary (Last 24 hours) at 2021 0915  Last data filed at 2021 2411  Gross per 24 hour   Intake --   Output 775 ml   Net -775 ml       Labs:  Hematology:  Recent Labs     08/23/21  1521 08/25/21  0646   WBC 9.4 11.9*   RBC 5.42 4.97   HGB 16.4 15.0   HCT 49.5 45.1   MCV 91.3 90.7   MCH 30.3 30.2   MCHC 33.2 33.3   RDW 14.0 13.4    231   MPV 7.5 10.0     Chemistry:  Recent Labs     08/23/21  1521 08/25/21  0646    138   K 3.8 3.4*    104   CO2 23 20   GLUCOSE 140* 112*   BUN 22 19   CREATININE 1.40* 1.15   MG  --  2.1   ANIONGAP 14 14   LABGLOM 51* >60   GFRAA >60 >60   CALCIUM 10.0 8.3*   No results for input(s): PROT, LABALBU, LABA1C, N9MVOTG, J5GEAOZ, FT4, TSH, AST, ALT, LDH, GGT, ALKPHOS, LABGGT, BILITOT, BILIDIR, AMMONIA, AMYLASE, LIPASE, LACTATE, CHOL, HDL, LDLCHOLESTEROL, CHOLHDLRATIO, TRIG, VLDL, PLS54TU, PHENYTOIN, PHENYF, URICACID, POCGLU in the last 72 hours. ABG:No results found for: POCPH, PHART, PH, POCPCO2, LHS4DNB, PCO2, POCPO2, PO2ART, PO2, POCHCO3, PZE8ENU, HCO3, NBEA, PBEA, BEART, BE, THGBART, THB, ALP2YWT, UBDA4TBU, X6PSKXBR, O2SAT, FIO2  Lab Results   Component Value Date/Time    SPECIAL NOT REPORTED 08/24/2021 11:31 AM     Lab Results   Component Value Date/Time    CULTURE NO GROWTH 08/24/2021 11:31 AM       Radiology:  CT ABDOMEN PELVIS WO CONTRAST Additional Contrast? None    Result Date: 8/23/2021  Right-sided obstructive uropathy with a 5 mm stone in the right mid ureter.        Physical Examination:        General appearance:  alert, cooperative and no distress  Mental Status:  oriented to person, place and time and normal affect  Lungs:  clear to auscultation bilaterally, normal effort  Heart:  regular rate and rhythm, no murmur  Abdomen:  soft, nontender, nondistended, normal bowel sounds, no masses, hepatomegaly, splenomegaly  Extremities:  no edema, redness, tenderness in the calves  Skin:  no gross lesions, rashes, induration    Assessment:        Hospital Problems         Last Modified POA    Kidney stone (Chronic) 8/23/2021 Yes          Plan:        Kidney stone.  NPO at midnight, cipro IV< IV fluids, strain all urine  HTN history, start norvasc/ discussed with patient about risk factors for stroke and MI prevention seems open to trying more as outpatient  Ambulating well on his own    Shanell Russell MD  8/25/2021  9:15 AM

## 2021-08-25 NOTE — PROGRESS NOTES
Emely Kathleen, Norma Casarez, Josefina Garcia, Anabel Jesus  Urology Progress Note     Subjective:   No acute events overnight  AFVSS  Nauseous overnight, vomited overnight   Denies F/C/CP/SOB   UOP 7755cc/24 hours       Patient Vitals for the past 24 hrs:   BP Temp Temp src Pulse Resp SpO2 Height Weight   08/24/21 1815 (!) 166/85 97.7 °F (36.5 °C) Oral -- -- -- -- --   08/24/21 1700 (!) 167/51 -- -- 62 -- -- -- --   08/24/21 1553 (!) 173/70 -- -- 57 -- -- -- --   08/24/21 1550 (!) 178/73 97.7 °F (36.5 °C) Oral 56 20 99 % -- --   08/24/21 1200 (!) 112/54 97.7 °F (36.5 °C) Oral 54 16 95 % -- --   08/24/21 1130 -- -- -- -- -- -- 5' 9\" (1.753 m) 230 lb (104.3 kg)   08/24/21 1022 121/81 98.4 °F (36.9 °C) Oral 57 16 97 % -- --       Intake/Output Summary (Last 24 hours) at 8/25/2021 0705  Last data filed at 8/25/2021 6618  Gross per 24 hour   Intake --   Output 775 ml   Net -775 ml       Recent Labs     08/23/21  1521 08/25/21  0646   WBC 9.4 11.9*   HGB 16.4 15.0   HCT 49.5 45.1   MCV 91.3 90.7    231     Recent Labs     08/23/21  1521      K 3.8      CO2 23   BUN 22   CREATININE 1.40*       Recent Labs     08/23/21  1713   COLORU YELLOW   PHUR 6.5   WBCUA 2 TO 5   RBCUA 20 TO 50   MUCUS NOT REPORTED   TRICHOMONAS NOT REPORTED   YEAST NOT REPORTED   BACTERIA None   SPECGRAV 1.027   LEUKOCYTESUR NEGATIVE   UROBILINOGEN Normal   BILIRUBINUR NEGATIVE       Additional Lab/culture results:    Physical Exam:   General: A/O x 3, no acute distress  Head: atraumatic, normocephalic   HEENT: moist membranes, PERRLA, EOMI  Respiratory: normal respiratory effort on room air  Cardiac: regular rate  Abdomen: soft, nontender, nondistended  Extremities: moves all extremities  Psych: appropriate mood       Interval Imaging Findings:    Impression:    Right mid ureteral 5 mm stone with hydroureteronephrosis after ESWL for 7 mm right lower pole stone    Plan:   Regular diet  NPOMN  Pain control and antiemetics as needed  Strain all urine  Continue flomax   COVID negative  Will plan for right ureteroscopy with HLL and possible stent placement tomorrow     Brenda Garcia MD  7:05 AM 8/25/2021

## 2021-08-26 ENCOUNTER — APPOINTMENT (OUTPATIENT)
Dept: GENERAL RADIOLOGY | Age: 66
DRG: 661 | End: 2021-08-26
Attending: INTERNAL MEDICINE
Payer: MEDICARE

## 2021-08-26 ENCOUNTER — ANESTHESIA (OUTPATIENT)
Dept: OPERATING ROOM | Age: 66
DRG: 661 | End: 2021-08-26
Payer: MEDICARE

## 2021-08-26 VITALS
WEIGHT: 227.9 LBS | TEMPERATURE: 98.8 F | SYSTOLIC BLOOD PRESSURE: 188 MMHG | HEART RATE: 63 BPM | OXYGEN SATURATION: 96 % | RESPIRATION RATE: 18 BRPM | BODY MASS INDEX: 33.75 KG/M2 | DIASTOLIC BLOOD PRESSURE: 85 MMHG | HEIGHT: 69 IN

## 2021-08-26 VITALS — OXYGEN SATURATION: 98 % | DIASTOLIC BLOOD PRESSURE: 79 MMHG | SYSTOLIC BLOOD PRESSURE: 130 MMHG

## 2021-08-26 PROBLEM — I10 ESSENTIAL HYPERTENSION: Status: ACTIVE | Noted: 2021-08-26

## 2021-08-26 PROCEDURE — 2580000003 HC RX 258: Performed by: UROLOGY

## 2021-08-26 PROCEDURE — 3600000014 HC SURGERY LEVEL 4 ADDTL 15MIN: Performed by: UROLOGY

## 2021-08-26 PROCEDURE — C2617 STENT, NON-COR, TEM W/O DEL: HCPCS | Performed by: UROLOGY

## 2021-08-26 PROCEDURE — 6370000000 HC RX 637 (ALT 250 FOR IP): Performed by: STUDENT IN AN ORGANIZED HEALTH CARE EDUCATION/TRAINING PROGRAM

## 2021-08-26 PROCEDURE — 2580000003 HC RX 258: Performed by: NURSE ANESTHETIST, CERTIFIED REGISTERED

## 2021-08-26 PROCEDURE — 7100000000 HC PACU RECOVERY - FIRST 15 MIN: Performed by: UROLOGY

## 2021-08-26 PROCEDURE — 0TC68ZZ EXTIRPATION OF MATTER FROM RIGHT URETER, VIA NATURAL OR ARTIFICIAL OPENING ENDOSCOPIC: ICD-10-PCS | Performed by: UROLOGY

## 2021-08-26 PROCEDURE — 6360000002 HC RX W HCPCS: Performed by: STUDENT IN AN ORGANIZED HEALTH CARE EDUCATION/TRAINING PROGRAM

## 2021-08-26 PROCEDURE — 3209999900 FLUORO FOR SURGICAL PROCEDURES

## 2021-08-26 PROCEDURE — 2709999900 HC NON-CHARGEABLE SUPPLY: Performed by: UROLOGY

## 2021-08-26 PROCEDURE — C1758 CATHETER, URETERAL: HCPCS | Performed by: UROLOGY

## 2021-08-26 PROCEDURE — 6360000002 HC RX W HCPCS: Performed by: NURSE ANESTHETIST, CERTIFIED REGISTERED

## 2021-08-26 PROCEDURE — 3700000000 HC ANESTHESIA ATTENDED CARE: Performed by: UROLOGY

## 2021-08-26 PROCEDURE — 7100000001 HC PACU RECOVERY - ADDTL 15 MIN: Performed by: UROLOGY

## 2021-08-26 PROCEDURE — 3600000004 HC SURGERY LEVEL 4 BASE: Performed by: UROLOGY

## 2021-08-26 PROCEDURE — 99239 HOSP IP/OBS DSCHRG MGMT >30: CPT | Performed by: STUDENT IN AN ORGANIZED HEALTH CARE EDUCATION/TRAINING PROGRAM

## 2021-08-26 PROCEDURE — 3700000001 HC ADD 15 MINUTES (ANESTHESIA): Performed by: UROLOGY

## 2021-08-26 PROCEDURE — 2720000010 HC SURG SUPPLY STERILE: Performed by: UROLOGY

## 2021-08-26 PROCEDURE — 2500000003 HC RX 250 WO HCPCS: Performed by: NURSE ANESTHETIST, CERTIFIED REGISTERED

## 2021-08-26 PROCEDURE — C1769 GUIDE WIRE: HCPCS | Performed by: UROLOGY

## 2021-08-26 PROCEDURE — 2500000003 HC RX 250 WO HCPCS: Performed by: STUDENT IN AN ORGANIZED HEALTH CARE EDUCATION/TRAINING PROGRAM

## 2021-08-26 PROCEDURE — 6360000002 HC RX W HCPCS

## 2021-08-26 PROCEDURE — 0T768DZ DILATION OF RIGHT URETER WITH INTRALUMINAL DEVICE, VIA NATURAL OR ARTIFICIAL OPENING ENDOSCOPIC: ICD-10-PCS | Performed by: UROLOGY

## 2021-08-26 DEVICE — URETERAL STENT
Type: IMPLANTABLE DEVICE | Status: FUNCTIONAL
Brand: POLARIS™ ULTRA

## 2021-08-26 RX ORDER — MEPERIDINE HYDROCHLORIDE 50 MG/ML
12.5 INJECTION INTRAMUSCULAR; INTRAVENOUS; SUBCUTANEOUS EVERY 5 MIN PRN
Status: DISCONTINUED | OUTPATIENT
Start: 2021-08-26 | End: 2021-08-26 | Stop reason: HOSPADM

## 2021-08-26 RX ORDER — PROPOFOL 10 MG/ML
INJECTION, EMULSION INTRAVENOUS PRN
Status: DISCONTINUED | OUTPATIENT
Start: 2021-08-26 | End: 2021-08-26 | Stop reason: SDUPTHER

## 2021-08-26 RX ORDER — LOSARTAN POTASSIUM 50 MG/1
50 TABLET ORAL DAILY
Qty: 30 TABLET | Refills: 0 | Status: SHIPPED | OUTPATIENT
Start: 2021-08-26

## 2021-08-26 RX ORDER — SODIUM CHLORIDE 9 MG/ML
25 INJECTION, SOLUTION INTRAVENOUS PRN
Status: DISCONTINUED | OUTPATIENT
Start: 2021-08-26 | End: 2021-08-26 | Stop reason: HOSPADM

## 2021-08-26 RX ORDER — AMLODIPINE BESYLATE 5 MG/1
5 TABLET ORAL DAILY
Qty: 30 TABLET | Refills: 0 | Status: SHIPPED | OUTPATIENT
Start: 2021-08-26

## 2021-08-26 RX ORDER — CEFAZOLIN SODIUM 1 G/3ML
INJECTION, POWDER, FOR SOLUTION INTRAMUSCULAR; INTRAVENOUS PRN
Status: DISCONTINUED | OUTPATIENT
Start: 2021-08-26 | End: 2021-08-26 | Stop reason: SDUPTHER

## 2021-08-26 RX ORDER — LOSARTAN POTASSIUM 50 MG/1
50 TABLET ORAL ONCE
Status: COMPLETED | OUTPATIENT
Start: 2021-08-26 | End: 2021-08-26

## 2021-08-26 RX ORDER — SODIUM CHLORIDE 0.9 % (FLUSH) 0.9 %
5-40 SYRINGE (ML) INJECTION EVERY 12 HOURS SCHEDULED
Status: DISCONTINUED | OUTPATIENT
Start: 2021-08-26 | End: 2021-08-26 | Stop reason: HOSPADM

## 2021-08-26 RX ORDER — SODIUM CHLORIDE 9 MG/ML
INJECTION, SOLUTION INTRAVENOUS CONTINUOUS PRN
Status: DISCONTINUED | OUTPATIENT
Start: 2021-08-26 | End: 2021-08-26 | Stop reason: SDUPTHER

## 2021-08-26 RX ORDER — SODIUM CHLORIDE 0.9 % (FLUSH) 0.9 %
5-40 SYRINGE (ML) INJECTION PRN
Status: DISCONTINUED | OUTPATIENT
Start: 2021-08-26 | End: 2021-08-26 | Stop reason: HOSPADM

## 2021-08-26 RX ORDER — SODIUM CHLORIDE, SODIUM LACTATE, POTASSIUM CHLORIDE, CALCIUM CHLORIDE 600; 310; 30; 20 MG/100ML; MG/100ML; MG/100ML; MG/100ML
INJECTION, SOLUTION INTRAVENOUS CONTINUOUS
Status: DISCONTINUED | OUTPATIENT
Start: 2021-08-26 | End: 2021-08-26

## 2021-08-26 RX ORDER — TAMSULOSIN HYDROCHLORIDE 0.4 MG/1
CAPSULE ORAL
Qty: 30 CAPSULE | Refills: 0 | Status: SHIPPED | OUTPATIENT
Start: 2021-08-26 | End: 2021-12-15 | Stop reason: SDUPTHER

## 2021-08-26 RX ORDER — MAGNESIUM HYDROXIDE 1200 MG/15ML
LIQUID ORAL PRN
Status: DISCONTINUED | OUTPATIENT
Start: 2021-08-26 | End: 2021-08-26 | Stop reason: ALTCHOICE

## 2021-08-26 RX ORDER — FENTANYL CITRATE 50 UG/ML
25 INJECTION, SOLUTION INTRAMUSCULAR; INTRAVENOUS EVERY 5 MIN PRN
Status: DISCONTINUED | OUTPATIENT
Start: 2021-08-26 | End: 2021-08-26 | Stop reason: HOSPADM

## 2021-08-26 RX ORDER — TRAMADOL HYDROCHLORIDE 50 MG/1
50 TABLET ORAL EVERY 6 HOURS PRN
Qty: 12 TABLET | Refills: 0 | Status: SHIPPED | OUTPATIENT
Start: 2021-08-26 | End: 2021-08-29

## 2021-08-26 RX ORDER — MIDAZOLAM HYDROCHLORIDE 1 MG/ML
INJECTION INTRAMUSCULAR; INTRAVENOUS
Status: COMPLETED
Start: 2021-08-26 | End: 2021-08-26

## 2021-08-26 RX ORDER — MIDAZOLAM HYDROCHLORIDE 2 MG/2ML
1 INJECTION, SOLUTION INTRAMUSCULAR; INTRAVENOUS EVERY 10 MIN PRN
Status: DISCONTINUED | OUTPATIENT
Start: 2021-08-26 | End: 2021-08-26 | Stop reason: HOSPADM

## 2021-08-26 RX ORDER — LIDOCAINE HYDROCHLORIDE 10 MG/ML
1 INJECTION, SOLUTION EPIDURAL; INFILTRATION; INTRACAUDAL; PERINEURAL
Status: DISCONTINUED | OUTPATIENT
Start: 2021-08-26 | End: 2021-08-26 | Stop reason: HOSPADM

## 2021-08-26 RX ORDER — CEPHALEXIN 500 MG/1
500 CAPSULE ORAL 4 TIMES DAILY
Qty: 12 CAPSULE | Refills: 0 | Status: SHIPPED | OUTPATIENT
Start: 2021-08-26

## 2021-08-26 RX ORDER — FENTANYL CITRATE 50 UG/ML
INJECTION, SOLUTION INTRAMUSCULAR; INTRAVENOUS PRN
Status: DISCONTINUED | OUTPATIENT
Start: 2021-08-26 | End: 2021-08-26 | Stop reason: SDUPTHER

## 2021-08-26 RX ORDER — MAGNESIUM HYDROXIDE 1200 MG/15ML
LIQUID ORAL CONTINUOUS PRN
Status: COMPLETED | OUTPATIENT
Start: 2021-08-26 | End: 2021-08-26

## 2021-08-26 RX ORDER — ONDANSETRON 2 MG/ML
INJECTION INTRAMUSCULAR; INTRAVENOUS PRN
Status: DISCONTINUED | OUTPATIENT
Start: 2021-08-26 | End: 2021-08-26 | Stop reason: SDUPTHER

## 2021-08-26 RX ORDER — DEXAMETHASONE SODIUM PHOSPHATE 10 MG/ML
INJECTION INTRAMUSCULAR; INTRAVENOUS PRN
Status: DISCONTINUED | OUTPATIENT
Start: 2021-08-26 | End: 2021-08-26 | Stop reason: SDUPTHER

## 2021-08-26 RX ORDER — LIDOCAINE HYDROCHLORIDE 10 MG/ML
INJECTION, SOLUTION EPIDURAL; INFILTRATION; INTRACAUDAL; PERINEURAL PRN
Status: DISCONTINUED | OUTPATIENT
Start: 2021-08-26 | End: 2021-08-26 | Stop reason: SDUPTHER

## 2021-08-26 RX ADMIN — FENTANYL CITRATE 25 MCG: 50 INJECTION, SOLUTION INTRAMUSCULAR; INTRAVENOUS at 09:22

## 2021-08-26 RX ADMIN — FENTANYL CITRATE 50 MCG: 50 INJECTION, SOLUTION INTRAMUSCULAR; INTRAVENOUS at 09:15

## 2021-08-26 RX ADMIN — AMLODIPINE BESYLATE 5 MG: 5 TABLET ORAL at 10:47

## 2021-08-26 RX ADMIN — LORAZEPAM 0.5 MG: 0.5 TABLET ORAL at 05:04

## 2021-08-26 RX ADMIN — PROPOFOL 200 MG: 10 INJECTION, EMULSION INTRAVENOUS at 09:15

## 2021-08-26 RX ADMIN — DEXAMETHASONE SODIUM PHOSPHATE 10 MG: 10 INJECTION INTRAMUSCULAR; INTRAVENOUS at 09:15

## 2021-08-26 RX ADMIN — ONDANSETRON 4 MG: 2 INJECTION, SOLUTION INTRAMUSCULAR; INTRAVENOUS at 09:44

## 2021-08-26 RX ADMIN — MIDAZOLAM HYDROCHLORIDE 1 MG: 2 INJECTION, SOLUTION INTRAMUSCULAR; INTRAVENOUS at 09:06

## 2021-08-26 RX ADMIN — HYDRALAZINE HYDROCHLORIDE 10 MG: 20 INJECTION INTRAMUSCULAR; INTRAVENOUS at 12:29

## 2021-08-26 RX ADMIN — CIPROFLOXACIN 400 MG: 2 INJECTION, SOLUTION INTRAVENOUS at 11:00

## 2021-08-26 RX ADMIN — TAMSULOSIN HYDROCHLORIDE 0.4 MG: 0.4 CAPSULE ORAL at 10:46

## 2021-08-26 RX ADMIN — LIDOCAINE HYDROCHLORIDE 50 MG: 10 INJECTION, SOLUTION EPIDURAL; INFILTRATION; INTRACAUDAL; PERINEURAL at 09:15

## 2021-08-26 RX ADMIN — LOSARTAN POTASSIUM 50 MG: 50 TABLET, FILM COATED ORAL at 14:30

## 2021-08-26 RX ADMIN — POLYETHYLENE GLYCOL 3350 17 G: 17 POWDER, FOR SOLUTION ORAL at 10:45

## 2021-08-26 RX ADMIN — MIDAZOLAM HYDROCHLORIDE 1 MG: 1 INJECTION, SOLUTION INTRAMUSCULAR; INTRAVENOUS at 09:06

## 2021-08-26 RX ADMIN — SODIUM CHLORIDE: 9 INJECTION, SOLUTION INTRAVENOUS at 09:12

## 2021-08-26 RX ADMIN — CEFAZOLIN 2000 MG: 1 INJECTION, POWDER, FOR SOLUTION INTRAMUSCULAR; INTRAVENOUS at 09:21

## 2021-08-26 RX ADMIN — ACETAMINOPHEN 1000 MG: 500 TABLET ORAL at 05:04

## 2021-08-26 RX ADMIN — LORAZEPAM 0.5 MG: 0.5 TABLET ORAL at 12:58

## 2021-08-26 RX ADMIN — FENTANYL CITRATE 25 MCG: 50 INJECTION, SOLUTION INTRAMUSCULAR; INTRAVENOUS at 09:29

## 2021-08-26 RX ADMIN — Medication 10 MG: at 13:42

## 2021-08-26 ASSESSMENT — PULMONARY FUNCTION TESTS
PIF_VALUE: 12
PIF_VALUE: 13
PIF_VALUE: 12
PIF_VALUE: 13
PIF_VALUE: 12
PIF_VALUE: 4
PIF_VALUE: 13
PIF_VALUE: 13
PIF_VALUE: 2
PIF_VALUE: 13
PIF_VALUE: 1
PIF_VALUE: 12
PIF_VALUE: 3
PIF_VALUE: 12
PIF_VALUE: 13
PIF_VALUE: 12
PIF_VALUE: 5
PIF_VALUE: 4
PIF_VALUE: 13
PIF_VALUE: 2
PIF_VALUE: 13
PIF_VALUE: 6
PIF_VALUE: 12

## 2021-08-26 ASSESSMENT — PAIN SCALES - GENERAL
PAINLEVEL_OUTOF10: 0
PAINLEVEL_OUTOF10: 3
PAINLEVEL_OUTOF10: 0
PAINLEVEL_OUTOF10: 0

## 2021-08-26 ASSESSMENT — PAIN - FUNCTIONAL ASSESSMENT
PAIN_FUNCTIONAL_ASSESSMENT: 0-10
PAIN_FUNCTIONAL_ASSESSMENT: 0-10

## 2021-08-26 NOTE — PLAN OF CARE
Problem: Falls - Risk of:  Goal: Will remain free from falls  Description: Will remain free from falls  Outcome: Ongoing  Goal: Absence of physical injury  Description: Absence of physical injury  Outcome: Ongoing     Problem: Pain:  Goal: Pain level will decrease  Description: Pain level will decrease  Outcome: Ongoing  Goal: Control of acute pain  Description: Control of acute pain  Outcome: Ongoing  Goal: Control of chronic pain  Description: Control of chronic pain  Outcome: Ongoing     Problem: Coping:  Goal: Expressions of feelings of enhanced comfort will increase  Description: Expressions of feelings of enhanced comfort will increase  Outcome: Ongoing     Problem: Urinary Elimination:  Goal: Ability to achieve a balanced intake and output will improve  Description: Ability to achieve a balanced intake and output will improve  Outcome: Ongoing  Goal: Ability to recognize the need to void and respond appropriately will improve  Description: Ability to recognize the need to void and respond appropriately will improve  Outcome: Ongoing

## 2021-08-26 NOTE — DISCHARGE SUMMARY
[facility]  Office: [address], DO, Kasey Flood, DO, Darcie Oconnell, DO, Eran Singh Blood, DO, Vincent Maldonado MD, Magi Arias MD, Queen Herberth MD, Jennifer Buitrago MD, Laura Guillen MD, Hayley Villa MD, Rose Arce MD, Heidy Núñez, DO, Tam Bravo MD, Jodie Santana DO, Sang Foley MD,  Ra Henry DO, Armando Ham MD, Michelle Alcocer MD, Venancio Pablo MD, Twyla Venegas MD, Eber Ray MD, Crissy Bradley MD, Heidi Wise MD, Chaz Duke, [facility], [facility], CNP, [facility] Nasir, CNP, Jacoby Traylor, CNS, Gucci Figueroa, CNP, Sidney Simmons, CNP, Laura Bedoya, CNP, Irineo Stokes, CNP, Colton Avila, CNP, Nadja Willard PA-C, Sam Alvarez, [facility], Beltran Turner, CNP, Richa Gunderson, CNP, Francie Ríos, [facility], Adonis Fonseca, CNP, Pablo Anand, CNP, Zeb Lawson, CNP, Maribeth Linn, CNP, [facility], [address] [facility]    Discharge Summary     Patient ID: Penny Perkins  :  1955   MRN: [id number]     ACCOUNT:  [de-identified]   Patient's PCP: Kyler Pereira MD  Admit Date: 2021   Discharge Date: 2021     Length of Stay: 2  Code Status:  Full Code  Admitting Physician: Sang Foley MD  Discharge Physician: Sang Foley MD     Active Discharge Diagnoses:     Hospital Problem Lists:  Principal Problem:    Kidney stone  Active Problems:    Benign prostatic hyperplasia without urinary obstruction    Urge urinary incontinence    Essential hypertension  Resolved Problems:    * No resolved hospital problems. *      Admission Condition:  stable     Discharged Condition: stable    Hospital Stay:     Hospital Course:  Penny Perkins is a 77 y.o. male who was admitted for the management of   Kidney stone , presented to ER with Abdominal pain.     77year old male past medical history of kidney stones and HTN presents with flank pain and being treated for nephrolithiasis on cipro and flomax underwent Cystoscopy, right uretoscopy, lithotripsy and uretal stent placement on 8/26/2021. Patient discharged on pain medications and instructed by urology about stent removal. Patient was started on Norvasc and cozaar to follow up with PCP for further evaluation for HTN        Significant therapeutic interventions: see above    Significant Diagnostic Studies:   Labs / Micro:  CBC:   Lab Results   Component Value Date    WBC 11.9 08/25/2021    RBC 4.97 08/25/2021    HGB 15.0 08/25/2021    HCT 45.1 08/25/2021    MCV 90.7 08/25/2021    MCH 30.2 08/25/2021    MCHC 33.3 08/25/2021    RDW 13.4 08/25/2021     08/25/2021     BMP:    Lab Results   Component Value Date    GLUCOSE 116 08/25/2021     08/25/2021    K 4.0 08/25/2021     08/25/2021    CO2 25 08/25/2021    ANIONGAP 12 08/25/2021    BUN 14 08/25/2021    CREATININE 0.99 08/25/2021    BUNCRER NOT REPORTED 08/25/2021    CALCIUM 8.9 08/25/2021    LABGLOM >60 08/25/2021    GFRAA >60 08/25/2021    GFR      08/25/2021    GFR NOT REPORTED 08/25/2021        Radiology:  CT ABDOMEN PELVIS WO CONTRAST Additional Contrast? None    Result Date: 8/23/2021  Right-sided obstructive uropathy with a 5 mm stone in the right mid ureter. Consultations:    Consults:     Final Specialist Recommendations/Findings:   IP CONSULT TO UROLOGY  IP CONSULT TO IV TEAM      The patient was seen and examined on day of discharge and this discharge summary is in conjunction with any daily progress note from day of discharge.     Discharge plan:     Disposition: Home    Physician Follow Up:     Gladys Rees, [address] [place] [address]  [address] [place] [address] MD Desiree Gallardo [address], [address]  [address] Ten[address] Wan, [address] [place] [address]  [phone]             Requiring Further Evaluation/Follow Up POST HOSPITALIZATION/Incidental Findings: follow up PCP, follow up urology outpatient    Diet: cardiac diet    Activity: As tolerated    Instructions to Patient: please take your medications for your blood pressure. Please follow up with your urologist and your PCP    Discharge Medications:      Medication List      START taking these medications    amLODIPine 5 MG tablet  Commonly known as: NORVASC  Take 1 tablet by mouth daily     cephALEXin 500 MG capsule  Commonly known as: KEFLEX  Take 1 capsule by mouth 4 times daily     losartan 50 MG tablet  Commonly known as: Cozaar  Take 1 tablet by mouth daily        CONTINUE taking these medications    Blood Pressure Kit  1 Units by Does not apply route daily     celecoxib 200 MG capsule  Commonly known as: CeleBREX  Take 1 capsule by mouth 2 times daily     tamsulosin 0.4 MG capsule  Commonly known as: FLOMAX  TAKE 1 CAPSULE BY MOUTH EVERY DAY     traMADol 50 MG tablet  Commonly known as: ULTRAM  Take 1 tablet by mouth every 6 hours as needed for Pain for up to 3 days. Where to Get Your Medications      These medications were sent to [facility]  [address] [facility], [address] [place] [address], [address] [address]    Phone: [phone]   amLODIPine 5 MG tablet  losartan 50 MG tablet     You can get these medications from any pharmacy    Bring a paper prescription for each of these medications  cephALEXin 500 MG capsule  tamsulosin 0.4 MG capsule  traMADol 50 MG tablet         No discharge procedures on file. Time Spent on discharge is  31 mins in patient examination, evaluation, counseling as well as medication reconciliation, prescriptions for required medications, discharge plan and follow up. Electronically signed by   Sang Foley MD  8/26/2021  2:26 PM      Thank you Dr. Kyler Pereira MD for the opportunity to be involved in this patient's care.

## 2021-08-26 NOTE — PROGRESS NOTES
Francisco Javier Mccain, Dallin Romero, Jian Boyd, Randi Hall  Urology Progress Note     Subjective:   No acute events overnight  AFVSS  Nauseous overnight, vomited overnight   Denies F/C/CP/SOB    UOP 1625cc/24 hours      Patient Vitals for the past 24 hrs:   BP Temp Temp src Pulse Resp SpO2 Weight   08/26/21 0508 -- -- -- -- -- -- 227 lb 14.4 oz (103.4 kg)   08/25/21 2305 (!) 158/51 -- -- 68 -- -- --   08/25/21 2130 (!) 180/78 -- -- 80 -- -- --   08/25/21 1957 (!) 174/78 98.3 °F (36.8 °C) Oral 76 16 96 % --   08/25/21 1731 (!) 172/73 -- -- -- -- -- --   08/25/21 1626 (!) 162/67 -- -- 69 18 96 % --   08/25/21 1430 (!) 188/68 -- -- 76 -- -- --   08/25/21 1307 (!) 189/75 -- -- 75 -- -- --   08/25/21 1305 (!) 197/71 -- -- -- -- -- --   08/25/21 1148 (!) 176/86 98 °F (36.7 °C) Oral 64 16 96 % --   08/25/21 0814 (!) 172/72 98.2 °F (36.8 °C) Oral 66 14 95 % --       Intake/Output Summary (Last 24 hours) at 8/26/2021 0655  Last data filed at 8/25/2021 1439  Gross per 24 hour   Intake --   Output 1625 ml   Net -1625 ml       Recent Labs     08/23/21  1521 08/25/21  0646   WBC 9.4 11.9*   HGB 16.4 15.0   HCT 49.5 45.1   MCV 91.3 90.7    231     Recent Labs     08/23/21  1521 08/25/21  0646 08/25/21 2009    138 139   K 3.8 3.4* 4.0    104 102   CO2 23 20 25   BUN 22 19 14   CREATININE 1.40* 1.15 0.99       Recent Labs     08/23/21  1713   COLORU YELLOW   PHUR 6.5   WBCUA 2 TO 5   RBCUA 20 TO 50   MUCUS NOT REPORTED   TRICHOMONAS NOT REPORTED   YEAST NOT REPORTED   BACTERIA None   SPECGRAV 1.027   LEUKOCYTESUR NEGATIVE   UROBILINOGEN Normal   BILIRUBINUR NEGATIVE       Additional Lab/culture results:  UCx - no growth    Physical Exam:   General: A/O x 3, no acute distress  Head: atraumatic, normocephalic   HEENT: moist membranes, PERRLA, EOMI  Respiratory: normal respiratory effort on room air  Cardiac: regular rate  Abdomen: soft, nontender, nondistended  Extremities: moves all extremities  Psych:

## 2021-08-26 NOTE — OP NOTE
Dr. Isaura Lopes MD      [address] [place]    DATE:  8/24/2021 - 8/26/2021    SURGEON:   Isaura Lopes MD    ASSISTANT:  Benoit Quiroz MD. PGY5     PREOPERATIVE DIAGNOSIS:  RIGHT URETERAL STONE    POSTOPERATIVE DIAGNOSIS:  same    PROCEDURE PERFORMED:  Cystoscopy, right ureteroscopy, holmium laser lithotripsy, & ureteral stent placement     ANESTHESIA:  General    COMPLICATIONS:  None. ESTIMATED BLOOD LOSS:  < 5 cc      SPECIMENS:  none    DRAINS:  right 6 Fr x 26 cm ureteral stent      INDICATIONS FOR PROCEDURE:  The patient is a 77 y.o. male with a history of kidney stones who is here today definitive treatment of stone burden. He had ESWL last week and presented with flank pain and emesis. He was found to have Steinstrasse of 12 mm in the right ureter on CT . The risks and benefits of the procedure as well as possible alternatives and complications were discussed and he consented    DETAILS OF THE PROCEDURE:  The patient was correctly identified in the preoperative holding area. he was brought back to the operating room and General anesthesia was administered. he was then prepped and draped in the usual sterile fashion in the dorsal lithotomy position. EPC cuffs were on, in place, and fully functional. he was given 2gm ancef IV  for antibiotic prophylaxis. After appropriate time-out was performed with all parties agreeing, a 25 Fr cystoscope with a 30 degree lens was inserted into the bladder. Bladder was inspected including the dome posterior and lateral walls as well as the anterior wall and trigone. These were found to be free of tumors or mucosal abnormalities. There are no foreign bodies . A 0.035 glidewire was inserted through the scope into the right ureteral orrifice and visualized in the renal pelvis under fluoroscopy. A dual lumen catheter  was inserted over the wire.   A second glidewire was inserted under fluoroscopic guidance and the catheter was removed. A flexible ureteroscope was inserted over the wire and advanced into the right ureter. There were a few stone(s) located in the right ureter. These were flushed into the upper pole. A 200 micron holmium laser fiber was inserted and used to fragment the stone into submillimeter sized pieces. The scope was advanced to the pelvis and a complete pyeloscopy was performed with no additional stones visualized. The scope was withdrawn and the ureter was clear of stones. The cystoscope was then backloaded over the wire and a 6 Fr x26 cm stent was advanced with good curl visualized in the  right renal pelvis under fluoroscopy and in the bladder under direct visualization. The bladder was drained and the scope was removed. The stent strings were attached to the penis with benzoin and steri strips. .  Dr Amarjit Anderson MD was scrubbed and present for the procedure. The patient tolerated the procedure well and was sent to PACU for postoperative monitoring. DISPOSITION:  The patient was transferred to the floor. There are not objections to discharge from a Urology standpoint. Keep on Antibiotics for 3 days. He was given instructions to follow up in 6 weeks for further evaluation of his symptoms. He was instructed to remove the stent via the externalized string in 4 days.

## 2021-08-26 NOTE — ANESTHESIA POSTPROCEDURE EVALUATION
Department of Anesthesiology  Postprocedure Note    Patient: Horacio Solis  MRN: [id number]  YOB: 1955  Date of evaluation: 8/26/2021  Time:  10:28 AM     Procedure Summary     Date: 08/26/21 Room / Location: [facility] 01 / [address]    Anesthesia Start: [address] [facility] C Anesthesia Stop: 6020    Procedure: HOLMIUM- CYSTO, URETEROSCOPY, LASER LITHO, STENT PLACEMENT (Right ) Diagnosis: (URETERAL STONE)    Surgeons: Blanco Lou MD Responsible Provider: Kam Paulino MD    Anesthesia Type: general ASA Status: 2          Anesthesia Type: general    Charisse Phase I: Charisse Score: 10    Charisse Phase II:      Last vitals: Reviewed and per EMR flowsheets.        Anesthesia Post Evaluation    Patient location during evaluation: PACU  Patient participation: complete - patient participated  Level of consciousness: awake and alert  Pain score: 3  Airway patency: patent  Nausea & Vomiting: no vomiting and no nausea  Complications: no  Cardiovascular status: hemodynamically stable  Respiratory status: acceptable  Hydration status: stable

## 2021-08-26 NOTE — PROGRESS NOTES
CLINICAL PHARMACY NOTE: MEDS TO BEDS    Total # of Prescriptions Filled: 5   The following medications were delivered to the patient:  · Amlodipine 5 mg tab  · Losartan 50 mg tab  · Tramadol 50 mg tab  · tamsulosin 0.4 mg cap  · Cephalexin 500 mg cap    Additional Documentation:Medications delivered to the patient in his room. 322 @ 2:52pm clover payment.

## 2021-08-26 NOTE — PROGRESS NOTES
[facility]  Office: [address], DO, Caro Simple, DO, King Haley, DO, Avis Mossseven Blood, DO, Delmer Huffman MD, Shaina Arnold MD, Jim Wise MD, Cecilia Desai MD, Danita Easley MD, Bryce Dias MD, Flavio Perales MD, Randi East, DO, Rafat Pretty MD, Che Hooper DO, Toby Pacheco MD,  Tereza Chauhan DO, Tigist Herbert MD, Lucas Joseph MD, Ralph Swann MD, Dong Hernandes MD, Asuncion Saeed MD, Joe Jacob MD, Humaira Gimenez MD, Clemencia Rose, [facility], [facility], CNP, Abi Singh, CNP, Theron Hendrix, CNS, Fredis Meyers, CNP, Neil Kiser, CNP, Triston Gutierres, CNP, Hilary Pina, CNP, Lisandro Stevenson, CNP, Ovidio Brown PA-C, Cuauhtemoc Zhou, [facility], Amber Walls, CNP, Esme Maynard, CNP, Pablo Hamilton, CNP, Danyel Vu, CNP, Veronica Maier, CNP, Pastor Marin, CNP, Hilaria Sanchez, CNP, Dedra Carlisle, [address] [place] [address]    Progress Note    8/26/2021    8:05 AM    Name:   Arturo Mujica  MRN:     [id number]     Acct:      [de-identified]   Room:   [address] [place] [address] Day:  2  Admit Date:  8/24/2021  9:55 AM    PCP:   Tomás Caldwell MD  Code Status:  Full Code    Subjective:     C/C: No chief complaint on file. abdominal pain  Interval History Status: not changed. paitent seen and examined abdominla pain improved.  Urinating well, has not noticed any stone passed yet    Brief History:     77year old male past medical history of kidney stones and HTN presents with flank pain and being treated for nephrolithiasis on cipro and flomax with plans for cystoscopy on 8/26/2021    Review of Systems:     Constitutional:  negative for chills, fevers, sweats  Respiratory:  negative for cough, dyspnea on exertion, shortness of breath, wheezing  Cardiovascular:  negative for chest pain, chest pressure/discomfort, lower extremity edema, palpitations  Gastrointestinal:  negative for abdominal pain, constipation, (Last 24 hours) at 8/26/2021 0805  Last data filed at 8/25/2021 1439  Gross per 24 hour   Intake --   Output 1625 ml   Net -1625 ml       Labs:  Hematology:  Recent Labs     08/23/21  1521 08/25/21  0646   WBC 9.4 11.9*   RBC 5.42 4.97   HGB 16.4 15.0   HCT 49.5 45.1   MCV 91.3 90.7   MCH 30.3 30.2   MCHC 33.2 33.3   RDW 14.0 13.4    231   MPV 7.5 10.0     Chemistry:  Recent Labs     08/23/21  1521 08/25/21  0646 08/25/21 2009    138 139   K 3.8 3.4* 4.0    104 102   CO2 23 20 25   GLUCOSE 140* 112* 116*   BUN 22 19 14   CREATININE 1.40* 1.15 0.99   MG  --  2.1  --    ANIONGAP 14 14 12   LABGLOM 51* >60 >60   GFRAA >60 >60 >60   CALCIUM 10.0 8.3* 8.9   No results for input(s): PROT, LABALBU, LABA1C, O4QHGVF, E3SNUXY, FT4, TSH, AST, ALT, LDH, GGT, ALKPHOS, LABGGT, BILITOT, BILIDIR, AMMONIA, AMYLASE, LIPASE, LACTATE, CHOL, HDL, LDLCHOLESTEROL, CHOLHDLRATIO, TRIG, VLDL, JKK28CT, PHENYTOIN, PHENYF, URICACID, POCGLU in the last 72 hours. ABG:No results found for: POCPH, PHART, PH, POCPCO2, SNC7JHI, PCO2, POCPO2, PO2ART, PO2, POCHCO3, UPV6KYF, HCO3, NBEA, PBEA, BEART, BE, THGBART, THB, SRL6XGY, URNF5NPP, Z7PHBZHN, O2SAT, FIO2  Lab Results   Component Value Date/Time    SPECIAL NOT REPORTED 08/24/2021 11:31 AM     Lab Results   Component Value Date/Time    CULTURE NO GROWTH 08/24/2021 11:31 AM       Radiology:  CT ABDOMEN PELVIS WO CONTRAST Additional Contrast? None    Result Date: 8/23/2021  Right-sided obstructive uropathy with a 5 mm stone in the right mid ureter.        Physical Examination:        General appearance:  alert, cooperative and no distress  Mental Status:  oriented to person, place and time and normal affect  Lungs:  clear to auscultation bilaterally, normal effort  Heart:  regular rate and rhythm, no murmur  Abdomen:  soft, nontender, nondistended, normal bowel sounds, no masses, hepatomegaly, splenomegaly  Extremities:  no edema, redness, tenderness in the calves  Skin:  no gross lesions, rashes, induration    Assessment:        Hospital Problems         Last Modified POA    Benign prostatic hyperplasia without urinary obstruction 8/26/2021 Yes    Urge urinary incontinence 8/26/2021 Yes    Kidney stone (Chronic) 8/23/2021 Yes    Essential hypertension 8/26/2021 Yes          Plan:        Kidney stone. NPO, cipro, IV fluids, strain all urine  HTN history, start norvasc/ discussed with patient about risk factors for stroke and MI prevention seems open to trying more as outpatient  Anxiety due to medical illness.  Ativan PRN  Ambulating well on his own    Toby Pacheco MD  8/26/2021  8:05 AM

## 2021-08-27 ENCOUNTER — CARE COORDINATION (OUTPATIENT)
Dept: CASE MANAGEMENT | Age: 66
End: 2021-08-27

## 2021-08-27 NOTE — CARE COORDINATION
[facility] Transitions Initial Follow Up Call    Call within 2 business days of discharge: Yes    Patient: Angelina Hebert Patient : 1955   MRN: [id number]  Reason for Admission: Kidney stone  Discharge Date: 21 RARS: Readmission Risk Score: 6      Last Discharge [facility]       Complaint Diagnosis Description Type Department Provider    21  Kidney stone . .. Admission (Discharged) Rick Franklin MD      First attempt at initial 24 hour CTN call     Spoke with: Called to speak with patient for initial   transition of care. Left HIPPA compliant voice message with contact information [phone] for a call  Back with an update. Facility: [facility]    Non-face-to-face services provided:  Obtained and reviewed discharge summary and/or continuity of care documents    Care Transitions 24 Hour Call    Care Transitions Interventions         Follow Up  No future appointments.     Manisha Alatorre LPN

## 2021-08-30 ENCOUNTER — CARE COORDINATION (OUTPATIENT)
Dept: CASE MANAGEMENT | Age: 66
End: 2021-08-30

## 2021-08-30 NOTE — CARE COORDINATION
[facility] Transitions Initial Follow Up Call    Call within 2 business days of discharge: Yes    Patient: Cristino Bertrand Patient : 1955   MRN: [id number]  Reason for Admission: Kidney stone  Discharge Date: 21 RARS: Readmission Risk Score: 6      Last Discharge [facility]       Complaint Diagnosis Description Type Department Provider    21  Kidney stone . .. Admission (Discharged) Cheryl Vogel MD      second attempt at initial 24 hour CTN call     Spoke with: Called to speak with patient for initial  transition of care. Left HIPPA compliant voice message with contact information [phone] for a call  Back with an update. Facility: [facility]    Non-face-to-face services provided:  Obtained and reviewed discharge summary and/or continuity of care documents    Care Transitions 24 Hour Call    Care Transitions Interventions         Follow Up  No future appointments.     Alvino Henry LPN

## 2021-09-21 DIAGNOSIS — M15.9 PRIMARY OSTEOARTHRITIS INVOLVING MULTIPLE JOINTS: ICD-10-CM

## 2021-09-21 NOTE — TELEPHONE ENCOUNTER
Next Visit Date:  No future appointments.     Health Maintenance   Topic Date Due    COVID-19 Vaccine (1) Never done    Diabetes screen  05/17/2019    Annual Wellness Visit (AWV)  Never done    Flu vaccine (1) Never done    Shingles Vaccine (1 of 2) 02/25/2022 (Originally 3/24/2005)    Pneumococcal 65+ years Vaccine (2 of 2 - PPSV23) 02/10/2022    Potassium monitoring  08/25/2022    Creatinine monitoring  08/25/2022    Colon cancer screen colonoscopy  01/06/2023    Lipid screen  11/19/2024    DTaP/Tdap/Td vaccine (2 - Td or Tdap) 06/14/2027    Hepatitis C screen  Completed    Hepatitis A vaccine  Aged Out    Hepatitis B vaccine  Aged Out    Hib vaccine  Aged Out    Meningococcal (ACWY) vaccine  Aged Out       No results found for: LABA1C          ( goal A1C is < 7)   No results found for: LABMICR  LDL Cholesterol (mg/dL)   Date Value   11/19/2019 109     LDL Calculated (mg/dL)   Date Value   04/18/2017 122   06/07/2016 156       (goal LDL is <100)   AST (U/L)   Date Value   06/12/2020 11     ALT (U/L)   Date Value   06/12/2020 11     BUN (mg/dL)   Date Value   08/25/2021 14     BP Readings from Last 3 Encounters:   08/26/21 130/79   08/26/21 (!) 188/85   08/24/21 (!) 166/92          (goal 120/80)    All Future Testing planned in [facility]  Lab Frequency Next Occurrence   Lipid, Fasting Once 08/04/2021   Comprehensive Metabolic Panel, Fasting Once 08/04/2021   TSH With Reflex Ft4 Once 08/04/2021   CBC Auto Differential Once 08/04/2021   Hemoglobin A1C Once 08/04/2021               Patient Active Problem List:     Unilateral primary osteoarthritis, right knee     Depression     Elevated BP without diagnosis of hypertension     Benign prostatic hyperplasia without urinary obstruction     Lower urinary tract symptoms due to benign prostatic hyperplasia     Urge urinary incontinence     Kidney stone     Essential hypertension

## 2021-09-22 RX ORDER — CELECOXIB 200 MG/1
CAPSULE ORAL
Qty: 180 CAPSULE | Refills: 1 | Status: SHIPPED | OUTPATIENT
Start: 2021-09-22

## 2021-12-15 DIAGNOSIS — N40.1 LOWER URINARY TRACT SYMPTOMS DUE TO BENIGN PROSTATIC HYPERPLASIA: ICD-10-CM

## 2021-12-15 RX ORDER — TAMSULOSIN HYDROCHLORIDE 0.4 MG/1
CAPSULE ORAL
Qty: 90 CAPSULE | Refills: 1 | Status: SHIPPED | OUTPATIENT
Start: 2021-12-15 | End: 2022-09-06

## 2021-12-15 NOTE — TELEPHONE ENCOUNTER
Ryan Crook is calling to request a refill on the following medication(s):    Medication Request:  Requested Prescriptions     Pending Prescriptions Disp Refills    tamsulosin (FLOMAX) 0.4 MG capsule 90 capsule 1     Sig: TAKE 1 CAPSULE BY MOUTH EVERY DAY       Last Visit Date (If Applicable):  6/0/6915    Next Visit Date:    Visit date not found

## 2021-12-20 ENCOUNTER — TELEPHONE (OUTPATIENT)
Dept: FAMILY MEDICINE CLINIC | Age: 66
End: 2021-12-20

## 2022-01-04 ENCOUNTER — TELEPHONE (OUTPATIENT)
Dept: FAMILY MEDICINE CLINIC | Age: 67
End: 2022-01-04

## 2022-01-04 NOTE — TELEPHONE ENCOUNTER
----- Message from Darryl Ericasadie sent at 1/3/2022  4:29 PM EST -----  Subject: Referral Request    QUESTIONS   Reason for referral request? patient is calling in to see if possible he   can get a chest x ray   Has the physician seen you for this condition before? No   Preferred Specialist (if applicable)? Do you already have an appointment scheduled? No  Additional Information for Provider?   ---------------------------------------------------------------------------  --------------  CALL BACK INFO  What is the best way for the office to contact you? OK to leave message on   voicemail  Preferred Call Back Phone Number?  [phone]

## 2022-01-07 ENCOUNTER — TELEPHONE (OUTPATIENT)
Dept: FAMILY MEDICINE CLINIC | Age: 67
End: 2022-01-07

## 2022-01-07 DIAGNOSIS — Z57.39 OCCUPATIONAL EXPOSURE TO AIR CONTAMINANTS: Primary | ICD-10-CM

## 2022-01-07 NOTE — TELEPHONE ENCOUNTER
----- Message from [address] [facility] sent at 1/6/2022  1:50 PM EST -----  Subject: Message to Provider    QUESTIONS  Information for Provider? Pt wants chest x-ray order sent to [facility]. Please let pt know when this has been sent to   them. 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 He didn't wear a mask, because it was not required   back then.  ---------------------------------------------------------------------------  --------------  CALL BACK INFO  What is the best way for the office to contact you? OK to leave message on   voicemail  Preferred Call Back Phone Number? [phone]  ---------------------------------------------------------------------------  --------------  SCRIPT ANSWERS  Relationship to Patient?  Self

## 2022-03-02 ENCOUNTER — TELEPHONE (OUTPATIENT)
Dept: FAMILY MEDICINE CLINIC | Age: 67
End: 2022-03-02

## 2022-03-02 DIAGNOSIS — Z57.39 OCCUPATIONAL EXPOSURE TO AIR CONTAMINANTS: ICD-10-CM

## 2022-03-02 NOTE — TELEPHONE ENCOUNTER
Patient called today wanting to know X RAY results I informed him we do not have the results it's showing still active. He stated he went to [facility] on [address]. I have called and requested for findings to be faxed to our office. Pt would like a call back when results are in.

## 2022-09-05 DIAGNOSIS — N40.1 LOWER URINARY TRACT SYMPTOMS DUE TO BENIGN PROSTATIC HYPERPLASIA: ICD-10-CM

## 2022-09-06 RX ORDER — TAMSULOSIN HYDROCHLORIDE 0.4 MG/1
CAPSULE ORAL
Qty: 90 CAPSULE | Refills: 0 | Status: SHIPPED | OUTPATIENT
Start: 2022-09-06

## 2022-09-06 NOTE — TELEPHONE ENCOUNTER
Jamia Brothers is calling to request a refill on the following medication(s):    Medication Request:  Requested Prescriptions     Pending Prescriptions Disp Refills    tamsulosin (FLOMAX) 0.4 MG capsule [Pharmacy Med Name: TAMSULOSIN HCL 0.4 MG CAPSULE] 90 capsule 1     Sig: TAKE 1 CAPSULE BY MOUTH EVERY DAY       Last Visit Date (If Applicable):  Visit date not found    Next Visit Date:    Visit date not found

## 2022-12-01 DIAGNOSIS — N40.1 LOWER URINARY TRACT SYMPTOMS DUE TO BENIGN PROSTATIC HYPERPLASIA: ICD-10-CM

## 2022-12-01 RX ORDER — TAMSULOSIN HYDROCHLORIDE 0.4 MG/1
CAPSULE ORAL
Qty: 90 CAPSULE | Refills: 0 | OUTPATIENT
Start: 2022-12-01

## 2022-12-01 NOTE — TELEPHONE ENCOUNTER
Cheikh Billings is calling to request a refill on the following medication(s):    Medication Request:  Requested Prescriptions     Pending Prescriptions Disp Refills    tamsulosin (FLOMAX) 0.4 MG capsule [Pharmacy Med Name: TAMSULOSIN HCL 0.4 MG CAPSULE] 90 capsule 0     Sig: TAKE 1 CAPSULE BY MOUTH EVERY DAY       Last Visit Date (If Applicable):  Visit date not found    Next Visit Date:    Visit date not found

## 2023-01-26 ENCOUNTER — HOSPITAL ENCOUNTER (OUTPATIENT)
Dept: GENERAL RADIOLOGY | Facility: CLINIC | Age: 68
Discharge: HOME | End: 2023-01-28
Payer: MEDICARE

## 2023-01-26 ENCOUNTER — HOSPITAL ENCOUNTER (OUTPATIENT)
Facility: CLINIC | Age: 68
Discharge: HOME | End: 2023-01-28
Payer: MEDICARE

## 2023-01-26 DIAGNOSIS — M25.511 RIGHT SHOULDER PAIN, UNSPECIFIED CHRONICITY: ICD-10-CM

## 2023-01-26 PROCEDURE — 73030 X-RAY EXAM OF SHOULDER: CPT

## 2023-02-03 ENCOUNTER — HOSPITAL ENCOUNTER (OUTPATIENT)
Facility: CLINIC | Age: 68
Discharge: HOME | End: 2023-02-03
Payer: MEDICARE

## 2023-02-03 LAB
ALBUMIN SERPL-MCNC: 4.3 G/DL (ref 3.5–5.2)
ALBUMIN/GLOBULIN RATIO: 1.4 (ref 1–2.5)
ALP SERPL-CCNC: 68 U/L (ref 40–129)
ALT SERPL-CCNC: 14 U/L (ref 5–41)
ANION GAP SERPL CALCULATED.3IONS-SCNC: 10 MMOL/L (ref 9–17)
AST SERPL-CCNC: 14 U/L
BILIRUB SERPL-MCNC: 0.4 MG/DL (ref 0.3–1.2)
BUN SERPL-MCNC: 14 MG/DL (ref 8–23)
CALCIUM SERPL-MCNC: 9.3 MG/DL (ref 8.6–10.4)
CHLORIDE SERPL-SCNC: 107 MMOL/L (ref 98–107)
CHOLEST SERPL-MCNC: 203 MG/DL
CHOLESTEROL/HDL RATIO: 4.3
CO2 SERPL-SCNC: 22 MMOL/L (ref 20–31)
CREAT SERPL-MCNC: 0.87 MG/DL (ref 0.7–1.2)
GFR SERPL CREATININE-BSD FRML MDRD: >60 ML/MIN/1.73M2
GLUCOSE SERPL-MCNC: 105 MG/DL (ref 70–99)
HCT VFR BLD AUTO: 46.6 % (ref 40.7–50.3)
HDLC SERPL-MCNC: 47 MG/DL
HGB BLD-MCNC: 15.4 G/DL (ref 13–17)
LDLC SERPL CALC-MCNC: 137 MG/DL (ref 0–130)
MCH RBC QN AUTO: 29.8 PG (ref 25.2–33.5)
MCHC RBC AUTO-ENTMCNC: 33 G/DL (ref 28.4–34.8)
MCV RBC AUTO: 90.3 FL (ref 82.6–102.9)
NRBC AUTOMATED: 0 PER 100 WBC
PDW BLD-RTO: 13.5 % (ref 11.8–14.4)
PLATELET # BLD AUTO: 239 K/UL (ref 138–453)
PMV BLD AUTO: 10 FL (ref 8.1–13.5)
POTASSIUM SERPL-SCNC: 3.9 MMOL/L (ref 3.7–5.3)
PROSTATE SPECIFIC ANTIGEN: 0.59 NG/ML
PROT SERPL-MCNC: 7.3 G/DL (ref 6.4–8.3)
RBC # BLD: 5.16 M/UL (ref 4.21–5.77)
SODIUM SERPL-SCNC: 139 MMOL/L (ref 135–144)
TRIGL SERPL-MCNC: 95 MG/DL
TSH SERPL-ACNC: 0.86 UIU/ML (ref 0.3–5)
WBC # BLD AUTO: 7.1 K/UL (ref 3.5–11.3)

## 2023-02-03 PROCEDURE — 80053 COMPREHEN METABOLIC PANEL: CPT

## 2023-02-03 PROCEDURE — 36415 COLL VENOUS BLD VENIPUNCTURE: CPT

## 2023-02-03 PROCEDURE — 80061 LIPID PANEL: CPT

## 2023-02-03 PROCEDURE — G0103 PSA SCREENING: HCPCS

## 2023-02-03 PROCEDURE — 84443 ASSAY THYROID STIM HORMONE: CPT

## 2023-02-03 PROCEDURE — 85027 COMPLETE CBC AUTOMATED: CPT

## 2023-04-11 NOTE — OP NOTE
Operative Note      Patient: Tanner Sierra  YOB: 1955  MRN: [id number]    Date of Procedure: 8/18/2021    Pre-Op Diagnosis: RIGHT KIDNEY STONE    Post-Op Diagnosis: Same       Procedure(s):  ESWL EXTRACORPOREAL SHOCK WAVE LITHOTRIPSY    Surgeon(s):  Yolanda France MD    Assistant:   Edwardo Patel MD    Anesthesia: General    Estimated Blood Loss (mL): Minimal    Complications: None    Specimens:   * No specimens in log *    Implants:  * No implants in log *      Drains: * No LDAs found *    Findings:   1. Right lower pole stone 7 mm    INDICATIONS FOR THE PROCEDURES:  Patient is a 77 y.o. male with a history of right kidney stone. He presents today for extracorporeal shock wave lithotripsy. The risks and benefits of the procedure as well as possible alternatives and complications were discussed and he consented. DETAILS OF THE PROCEDURE:  Patient was correctly identified in the preoperative holding area. he was brought back to the operating room and placed under general endotracheal anesthesia. Prophylactic antibiotics were given in the form of Ancef 2gm IV. He was then placed in the supine position. The lithotriptor was positioned using fluoroscopic guidance. Shocks were administered as follows:  2 minute pause: Yes  Total Shocks: 3000  Frequency: 80/min  Voltage:1-7V    At end of the procedure the stone was shown to have broken into two large fragments on fluoroscopy. The patient was awoken and sent to PACU for post-operative monitoring    DISPOSITION:  Patient was discharged home in stable condition with appropriate follow-up in clinic in 3-4 weeks with KUB.       Electronically signed by Edwardo Patel MD on 8/18/2021 at 3:10 PM Performed Resulted

## 2024-01-22 ENCOUNTER — HOSPITAL ENCOUNTER (OUTPATIENT)
Facility: CLINIC | Age: 69
Discharge: HOME | End: 2024-01-22
Payer: MEDICARE

## 2024-01-22 LAB
ALBUMIN SERPL-MCNC: 4.2 G/DL (ref 3.5–5.2)
ALBUMIN/GLOB SERPL: 2 {RATIO} (ref 1–2.5)
ALP SERPL-CCNC: 65 U/L (ref 40–129)
ALT SERPL-CCNC: 6 U/L (ref 10–50)
ANION GAP SERPL CALCULATED.3IONS-SCNC: 13 MMOL/L (ref 9–16)
AST SERPL-CCNC: 16 U/L (ref 10–50)
BILIRUB SERPL-MCNC: 0.5 MG/DL (ref 0–1.2)
BUN SERPL-MCNC: 19 MG/DL (ref 8–23)
CALCIUM SERPL-MCNC: 9.1 MG/DL (ref 8.6–10.4)
CHLORIDE SERPL-SCNC: 106 MMOL/L (ref 98–107)
CHOLEST SERPL-MCNC: 197 MG/DL (ref 0–199)
CHOLESTEROL/HDL RATIO: 4
CO2 SERPL-SCNC: 23 MMOL/L (ref 20–31)
CREAT SERPL-MCNC: 1 MG/DL (ref 0.7–1.2)
ERYTHROCYTE [DISTWIDTH] IN BLOOD BY AUTOMATED COUNT: 13.6 % (ref 11.8–14.4)
GFR SERPL CREATININE-BSD FRML MDRD: >60 ML/MIN/1.73M2
GLUCOSE P FAST SERPL-MCNC: 104 MG/DL (ref 74–99)
HCT VFR BLD AUTO: 47.1 % (ref 40.7–50.3)
HDLC SERPL-MCNC: 52 MG/DL
HGB BLD-MCNC: 15.9 G/DL (ref 13–17)
LDLC SERPL CALC-MCNC: 127 MG/DL (ref 0–100)
MCH RBC QN AUTO: 30.5 PG (ref 25.2–33.5)
MCHC RBC AUTO-ENTMCNC: 33.8 G/DL (ref 28.4–34.8)
MCV RBC AUTO: 90.4 FL (ref 82.6–102.9)
NRBC BLD-RTO: 0 PER 100 WBC
PLATELET # BLD AUTO: 212 K/UL (ref 138–453)
PMV BLD AUTO: 9.8 FL (ref 8.1–13.5)
POTASSIUM SERPL-SCNC: 4.4 MMOL/L (ref 3.7–5.3)
PROT SERPL-MCNC: 7 G/DL (ref 6.6–8.7)
PSA SERPL-MCNC: 0.7 NG/ML (ref 0–4)
RBC # BLD AUTO: 5.21 M/UL (ref 4.21–5.77)
SODIUM SERPL-SCNC: 142 MMOL/L (ref 136–145)
TRIGL SERPL-MCNC: 91 MG/DL (ref 0–149)
VIT B12 SERPL-MCNC: 448 PG/ML (ref 232–1245)
VLDLC SERPL CALC-MCNC: 18 MG/DL
WBC OTHER # BLD: 7 K/UL (ref 3.5–11.3)

## 2024-01-22 PROCEDURE — 82607 VITAMIN B-12: CPT

## 2024-01-22 PROCEDURE — 85027 COMPLETE CBC AUTOMATED: CPT

## 2024-01-22 PROCEDURE — 80053 COMPREHEN METABOLIC PANEL: CPT

## 2024-01-22 PROCEDURE — 36415 COLL VENOUS BLD VENIPUNCTURE: CPT

## 2024-01-22 PROCEDURE — G0103 PSA SCREENING: HCPCS

## 2024-01-22 PROCEDURE — 80061 LIPID PANEL: CPT

## 2024-04-30 ENCOUNTER — HOSPITAL ENCOUNTER (OUTPATIENT)
Facility: CLINIC | Age: 69
Discharge: HOME | End: 2024-04-30
Payer: MEDICARE

## 2024-04-30 LAB
ALBUMIN SERPL-MCNC: 4.3 G/DL (ref 3.5–5.2)
ALBUMIN/GLOB SERPL: 1 {RATIO} (ref 1–2.5)
ALP SERPL-CCNC: 67 U/L (ref 40–129)
ALT SERPL-CCNC: 15 U/L (ref 10–50)
ANION GAP SERPL CALCULATED.3IONS-SCNC: 12 MMOL/L (ref 9–16)
AST SERPL-CCNC: 18 U/L (ref 10–50)
BILIRUB SERPL-MCNC: 0.5 MG/DL (ref 0–1.2)
BUN SERPL-MCNC: 18 MG/DL (ref 8–23)
CALCIUM SERPL-MCNC: 9.1 MG/DL (ref 8.6–10.4)
CHLORIDE SERPL-SCNC: 108 MMOL/L (ref 98–107)
CHOLEST SERPL-MCNC: 200 MG/DL (ref 0–199)
CHOLESTEROL/HDL RATIO: 4
CO2 SERPL-SCNC: 22 MMOL/L (ref 20–31)
CREAT SERPL-MCNC: 1 MG/DL (ref 0.7–1.2)
ERYTHROCYTE [DISTWIDTH] IN BLOOD BY AUTOMATED COUNT: 13.2 % (ref 11.8–14.4)
GFR SERPL CREATININE-BSD FRML MDRD: 83 ML/MIN/1.73M2
GLUCOSE SERPL-MCNC: 107 MG/DL (ref 74–99)
HCT VFR BLD AUTO: 47.3 % (ref 40.7–50.3)
HDLC SERPL-MCNC: 53 MG/DL
HGB BLD-MCNC: 15.6 G/DL (ref 13–17)
LDLC SERPL CALC-MCNC: 133 MG/DL (ref 0–100)
MCH RBC QN AUTO: 29.5 PG (ref 25.2–33.5)
MCHC RBC AUTO-ENTMCNC: 33 G/DL (ref 28.4–34.8)
MCV RBC AUTO: 89.4 FL (ref 82.6–102.9)
NRBC BLD-RTO: 0 PER 100 WBC
PLATELET # BLD AUTO: 234 K/UL (ref 138–453)
PMV BLD AUTO: 10.3 FL (ref 8.1–13.5)
POTASSIUM SERPL-SCNC: 4.2 MMOL/L (ref 3.7–5.3)
PROT SERPL-MCNC: 7.3 G/DL (ref 6.6–8.7)
RBC # BLD AUTO: 5.29 M/UL (ref 4.21–5.77)
SODIUM SERPL-SCNC: 142 MMOL/L (ref 136–145)
TRIGL SERPL-MCNC: 72 MG/DL (ref 0–149)
VLDLC SERPL CALC-MCNC: 14 MG/DL
WBC OTHER # BLD: 5.9 K/UL (ref 3.5–11.3)

## 2024-04-30 PROCEDURE — 80053 COMPREHEN METABOLIC PANEL: CPT

## 2024-04-30 PROCEDURE — 80061 LIPID PANEL: CPT

## 2024-04-30 PROCEDURE — 36415 COLL VENOUS BLD VENIPUNCTURE: CPT

## 2024-04-30 PROCEDURE — 85027 COMPLETE CBC AUTOMATED: CPT

## 2024-08-26 ENCOUNTER — HOSPITAL ENCOUNTER (OUTPATIENT)
Facility: CLINIC | Age: 69
Discharge: HOME | End: 2024-08-26
Payer: MEDICARE

## 2024-08-26 LAB
ALBUMIN SERPL-MCNC: 4.5 G/DL (ref 3.5–5.2)
ALBUMIN/GLOB SERPL: 2 {RATIO} (ref 1–2.5)
ALP SERPL-CCNC: 72 U/L (ref 40–129)
ALT SERPL-CCNC: 12 U/L (ref 10–50)
ANION GAP SERPL CALCULATED.3IONS-SCNC: 11 MMOL/L (ref 9–16)
AST SERPL-CCNC: 17 U/L (ref 10–50)
BILIRUB SERPL-MCNC: 0.5 MG/DL (ref 0–1.2)
BUN SERPL-MCNC: 14 MG/DL (ref 8–23)
CALCIUM SERPL-MCNC: 9.4 MG/DL (ref 8.6–10.4)
CHLORIDE SERPL-SCNC: 109 MMOL/L (ref 98–107)
CHOLEST SERPL-MCNC: 199 MG/DL (ref 0–199)
CHOLESTEROL/HDL RATIO: 4
CO2 SERPL-SCNC: 22 MMOL/L (ref 20–31)
CREAT SERPL-MCNC: 1.1 MG/DL (ref 0.7–1.2)
ERYTHROCYTE [DISTWIDTH] IN BLOOD BY AUTOMATED COUNT: 13.2 % (ref 11.8–14.4)
GFR, ESTIMATED: 76 ML/MIN/1.73M2
GLUCOSE SERPL-MCNC: 110 MG/DL (ref 74–99)
HCT VFR BLD AUTO: 48.8 % (ref 40.7–50.3)
HDLC SERPL-MCNC: 45 MG/DL
HGB BLD-MCNC: 16.1 G/DL (ref 13–17)
LDLC SERPL CALC-MCNC: 124 MG/DL (ref 0–100)
MCH RBC QN AUTO: 29.8 PG (ref 25.2–33.5)
MCHC RBC AUTO-ENTMCNC: 33 G/DL (ref 28.4–34.8)
MCV RBC AUTO: 90.4 FL (ref 82.6–102.9)
NRBC BLD-RTO: 0 PER 100 WBC
PLATELET # BLD AUTO: 235 K/UL (ref 138–453)
PMV BLD AUTO: 10 FL (ref 8.1–13.5)
POTASSIUM SERPL-SCNC: 4 MMOL/L (ref 3.7–5.3)
PROT SERPL-MCNC: 7.5 G/DL (ref 6.6–8.7)
RBC # BLD AUTO: 5.4 M/UL (ref 4.21–5.77)
SODIUM SERPL-SCNC: 142 MMOL/L (ref 136–145)
TRIGL SERPL-MCNC: 152 MG/DL (ref 0–149)
VLDLC SERPL CALC-MCNC: 30 MG/DL
WBC OTHER # BLD: 6.5 K/UL (ref 3.5–11.3)

## 2024-08-26 PROCEDURE — 85027 COMPLETE CBC AUTOMATED: CPT

## 2024-08-26 PROCEDURE — 80061 LIPID PANEL: CPT

## 2024-08-26 PROCEDURE — 36415 COLL VENOUS BLD VENIPUNCTURE: CPT

## 2024-08-26 PROCEDURE — 80053 COMPREHEN METABOLIC PANEL: CPT

## 2024-11-05 ENCOUNTER — HOSPITAL ENCOUNTER (OUTPATIENT)
Facility: CLINIC | Age: 69
Discharge: HOME | End: 2024-11-07
Payer: MEDICARE

## 2024-11-05 ENCOUNTER — HOSPITAL ENCOUNTER (OUTPATIENT)
Dept: GENERAL RADIOLOGY | Facility: CLINIC | Age: 69
Discharge: HOME | End: 2024-11-07
Payer: MEDICARE

## 2024-11-05 DIAGNOSIS — M25.552 LEFT HIP PAIN: ICD-10-CM

## 2024-11-05 PROCEDURE — 73502 X-RAY EXAM HIP UNI 2-3 VIEWS: CPT

## 2024-11-25 ENCOUNTER — HOSPITAL ENCOUNTER (OUTPATIENT)
Facility: CLINIC | Age: 69
Discharge: HOME | End: 2024-11-25
Payer: MEDICARE

## 2024-11-25 LAB
ALBUMIN SERPL-MCNC: 4.3 G/DL (ref 3.5–5.2)
ALBUMIN/GLOB SERPL: 1.5 {RATIO} (ref 1–2.5)
ALP SERPL-CCNC: 73 U/L (ref 40–129)
ALT SERPL-CCNC: 9 U/L (ref 10–50)
ANION GAP SERPL CALCULATED.3IONS-SCNC: 11 MMOL/L (ref 9–16)
AST SERPL-CCNC: 17 U/L (ref 10–50)
BILIRUB SERPL-MCNC: 0.4 MG/DL (ref 0–1.2)
BUN SERPL-MCNC: 15 MG/DL (ref 8–23)
CALCIUM SERPL-MCNC: 9 MG/DL (ref 8.6–10.4)
CHLORIDE SERPL-SCNC: 109 MMOL/L (ref 98–107)
CHOLEST SERPL-MCNC: 198 MG/DL (ref 0–199)
CHOLESTEROL/HDL RATIO: 4.1
CO2 SERPL-SCNC: 23 MMOL/L (ref 20–31)
CREAT SERPL-MCNC: 1 MG/DL (ref 0.7–1.2)
ERYTHROCYTE [DISTWIDTH] IN BLOOD BY AUTOMATED COUNT: 13 % (ref 11.8–14.4)
EST. AVERAGE GLUCOSE BLD GHB EST-MCNC: 105 MG/DL
GFR, ESTIMATED: 81 ML/MIN/1.73M2
GLUCOSE SERPL-MCNC: 103 MG/DL (ref 74–99)
HBA1C MFR BLD: 5.3 % (ref 4–6)
HCT VFR BLD AUTO: 47.6 % (ref 40.7–50.3)
HDLC SERPL-MCNC: 48 MG/DL
HGB BLD-MCNC: 15.6 G/DL (ref 13–17)
LDLC SERPL CALC-MCNC: 129 MG/DL (ref 0–100)
MCH RBC QN AUTO: 30 PG (ref 25.2–33.5)
MCHC RBC AUTO-ENTMCNC: 32.8 G/DL (ref 28.4–34.8)
MCV RBC AUTO: 91.5 FL (ref 82.6–102.9)
NRBC BLD-RTO: 0 PER 100 WBC
PLATELET # BLD AUTO: 227 K/UL (ref 138–453)
PMV BLD AUTO: 10 FL (ref 8.1–13.5)
POTASSIUM SERPL-SCNC: 4.1 MMOL/L (ref 3.7–5.3)
PROT SERPL-MCNC: 7.1 G/DL (ref 6.6–8.7)
RBC # BLD AUTO: 5.2 M/UL (ref 4.21–5.77)
SODIUM SERPL-SCNC: 143 MMOL/L (ref 136–145)
TRIGL SERPL-MCNC: 103 MG/DL
VLDLC SERPL CALC-MCNC: 21 MG/DL (ref 1–30)
WBC OTHER # BLD: 6.5 K/UL (ref 3.5–11.3)

## 2024-11-25 PROCEDURE — 80061 LIPID PANEL: CPT

## 2024-11-25 PROCEDURE — 85027 COMPLETE CBC AUTOMATED: CPT

## 2024-11-25 PROCEDURE — 80053 COMPREHEN METABOLIC PANEL: CPT

## 2024-11-25 PROCEDURE — 83036 HEMOGLOBIN GLYCOSYLATED A1C: CPT

## 2024-11-25 PROCEDURE — 36415 COLL VENOUS BLD VENIPUNCTURE: CPT

## 2025-02-25 ENCOUNTER — HOSPITAL ENCOUNTER (OUTPATIENT)
Facility: CLINIC | Age: 70
Discharge: HOME | End: 2025-02-25
Payer: MEDICARE

## 2025-02-25 LAB
ALBUMIN SERPL-MCNC: 4.2 G/DL (ref 3.5–5.2)
ALBUMIN/GLOB SERPL: 1.4 {RATIO} (ref 1–2.5)
ALP SERPL-CCNC: 73 U/L (ref 40–129)
ALT SERPL-CCNC: 14 U/L (ref 10–50)
ANION GAP SERPL CALCULATED.3IONS-SCNC: 12 MMOL/L (ref 9–16)
AST SERPL-CCNC: 17 U/L (ref 10–50)
BILIRUB SERPL-MCNC: 0.6 MG/DL (ref 0–1.2)
BUN SERPL-MCNC: 17 MG/DL (ref 8–23)
CALCIUM SERPL-MCNC: 9.2 MG/DL (ref 8.6–10.4)
CHLORIDE SERPL-SCNC: 107 MMOL/L (ref 98–107)
CHOLEST SERPL-MCNC: 193 MG/DL (ref 0–199)
CHOLESTEROL/HDL RATIO: 4.2
CO2 SERPL-SCNC: 22 MMOL/L (ref 20–31)
CREAT SERPL-MCNC: 1.1 MG/DL (ref 0.7–1.2)
ERYTHROCYTE [DISTWIDTH] IN BLOOD BY AUTOMATED COUNT: 13.2 % (ref 11.8–14.4)
EST. AVERAGE GLUCOSE BLD GHB EST-MCNC: 108 MG/DL
GFR, ESTIMATED: 73 ML/MIN/1.73M2
GLUCOSE SERPL-MCNC: 99 MG/DL (ref 74–99)
HBA1C MFR BLD: 5.4 % (ref 4–6)
HCT VFR BLD AUTO: 47.5 % (ref 40.7–50.3)
HDLC SERPL-MCNC: 46 MG/DL
HGB BLD-MCNC: 15.8 G/DL (ref 13–17)
LDLC SERPL CALC-MCNC: 127 MG/DL (ref 0–100)
MCH RBC QN AUTO: 29.9 PG (ref 25.2–33.5)
MCHC RBC AUTO-ENTMCNC: 33.3 G/DL (ref 28.4–34.8)
MCV RBC AUTO: 90 FL (ref 82.6–102.9)
NRBC BLD-RTO: 0 PER 100 WBC
PLATELET # BLD AUTO: 205 K/UL (ref 138–453)
PMV BLD AUTO: 10.4 FL (ref 8.1–13.5)
POTASSIUM SERPL-SCNC: 4.4 MMOL/L (ref 3.7–5.3)
PROT SERPL-MCNC: 7.2 G/DL (ref 6.6–8.7)
PSA SERPL-MCNC: 0.57 NG/ML (ref 0–4)
RBC # BLD AUTO: 5.28 M/UL (ref 4.21–5.77)
SODIUM SERPL-SCNC: 141 MMOL/L (ref 136–145)
T3 SERPL-MCNC: 111 NG/DL (ref 80–200)
T4 SERPL-MCNC: 6.2 UG/DL (ref 4.5–11.7)
TRIGL SERPL-MCNC: 98 MG/DL
TSH SERPL DL<=0.05 MIU/L-ACNC: 0.91 UIU/ML (ref 0.27–4.2)
VIT B12 SERPL-MCNC: 572 PG/ML (ref 232–1245)
VLDLC SERPL CALC-MCNC: 20 MG/DL (ref 1–30)
WBC OTHER # BLD: 6.5 K/UL (ref 3.5–11.3)

## 2025-02-25 PROCEDURE — G0103 PSA SCREENING: HCPCS

## 2025-02-25 PROCEDURE — 83036 HEMOGLOBIN GLYCOSYLATED A1C: CPT

## 2025-02-25 PROCEDURE — 84480 ASSAY TRIIODOTHYRONINE (T3): CPT

## 2025-02-25 PROCEDURE — 84436 ASSAY OF TOTAL THYROXINE: CPT

## 2025-02-25 PROCEDURE — 85027 COMPLETE CBC AUTOMATED: CPT

## 2025-02-25 PROCEDURE — 80053 COMPREHEN METABOLIC PANEL: CPT

## 2025-02-25 PROCEDURE — 80061 LIPID PANEL: CPT

## 2025-02-25 PROCEDURE — 84443 ASSAY THYROID STIM HORMONE: CPT

## 2025-02-25 PROCEDURE — 82607 VITAMIN B-12: CPT

## 2025-02-25 PROCEDURE — 36415 COLL VENOUS BLD VENIPUNCTURE: CPT

## 2025-05-20 ENCOUNTER — HOSPITAL ENCOUNTER (OUTPATIENT)
Facility: CLINIC | Age: 70
Discharge: HOME | End: 2025-05-20
Payer: MEDICARE

## 2025-05-20 LAB
ALBUMIN SERPL-MCNC: 4.1 G/DL (ref 3.5–5.2)
ALBUMIN/GLOB SERPL: 1.4 {RATIO} (ref 1–2.5)
ALP SERPL-CCNC: 76 U/L (ref 40–129)
ALT SERPL-CCNC: 12 U/L (ref 10–50)
ANION GAP SERPL CALCULATED.3IONS-SCNC: 10 MMOL/L (ref 9–16)
AST SERPL-CCNC: 13 U/L (ref 10–50)
BILIRUB SERPL-MCNC: 0.5 MG/DL (ref 0–1.2)
BUN SERPL-MCNC: 16 MG/DL (ref 8–23)
CALCIUM SERPL-MCNC: 9.2 MG/DL (ref 8.6–10.4)
CHLORIDE SERPL-SCNC: 108 MMOL/L (ref 98–107)
CHOLEST SERPL-MCNC: 187 MG/DL (ref 0–199)
CHOLESTEROL/HDL RATIO: 4
CO2 SERPL-SCNC: 23 MMOL/L (ref 20–31)
CREAT SERPL-MCNC: 1.1 MG/DL (ref 0.7–1.2)
ERYTHROCYTE [DISTWIDTH] IN BLOOD BY AUTOMATED COUNT: 13 % (ref 11.8–14.4)
GFR, ESTIMATED: 72 ML/MIN/1.73M2
GLUCOSE SERPL-MCNC: 111 MG/DL (ref 74–99)
HCT VFR BLD AUTO: 47 % (ref 40.7–50.3)
HDLC SERPL-MCNC: 47 MG/DL
HGB BLD-MCNC: 15.7 G/DL (ref 13–17)
LDLC SERPL CALC-MCNC: 125 MG/DL (ref 0–100)
MCH RBC QN AUTO: 30 PG (ref 25.2–33.5)
MCHC RBC AUTO-ENTMCNC: 33.4 G/DL (ref 28.4–34.8)
MCV RBC AUTO: 89.9 FL (ref 82.6–102.9)
NRBC BLD-RTO: 0 PER 100 WBC
PLATELET # BLD AUTO: 249 K/UL (ref 138–453)
PMV BLD AUTO: 9.8 FL (ref 8.1–13.5)
POTASSIUM SERPL-SCNC: 4.1 MMOL/L (ref 3.7–5.3)
PROT SERPL-MCNC: 7.1 G/DL (ref 6.6–8.7)
RBC # BLD AUTO: 5.23 M/UL (ref 4.21–5.77)
SODIUM SERPL-SCNC: 141 MMOL/L (ref 136–145)
TRIGL SERPL-MCNC: 77 MG/DL (ref 0–149)
VLDLC SERPL CALC-MCNC: 15 MG/DL (ref 1–30)
WBC OTHER # BLD: 5.6 K/UL (ref 3.5–11.3)

## 2025-05-20 PROCEDURE — 80061 LIPID PANEL: CPT

## 2025-05-20 PROCEDURE — 85027 COMPLETE CBC AUTOMATED: CPT

## 2025-05-20 PROCEDURE — 80053 COMPREHEN METABOLIC PANEL: CPT

## 2025-05-20 PROCEDURE — 36415 COLL VENOUS BLD VENIPUNCTURE: CPT

## 2025-08-06 ENCOUNTER — TELEPHONE (OUTPATIENT)
Dept: SURGERY | Age: 70
End: 2025-08-06

## 2025-08-12 ENCOUNTER — OFFICE VISIT (OUTPATIENT)
Dept: SURGERY | Age: 70
End: 2025-08-12
Payer: MEDICARE

## 2025-08-12 VITALS
HEIGHT: 69 IN | HEART RATE: 75 BPM | WEIGHT: 240 LBS | BODY MASS INDEX: 35.55 KG/M2 | DIASTOLIC BLOOD PRESSURE: 88 MMHG | SYSTOLIC BLOOD PRESSURE: 161 MMHG

## 2025-08-12 DIAGNOSIS — K40.90 NON-RECURRENT UNILATERAL INGUINAL HERNIA WITHOUT OBSTRUCTION OR GANGRENE: Primary | ICD-10-CM

## 2025-08-12 PROCEDURE — G8417 CALC BMI ABV UP PARAM F/U: HCPCS | Performed by: STUDENT IN AN ORGANIZED HEALTH CARE EDUCATION/TRAINING PROGRAM

## 2025-08-12 PROCEDURE — 99205 OFFICE O/P NEW HI 60 MIN: CPT | Performed by: STUDENT IN AN ORGANIZED HEALTH CARE EDUCATION/TRAINING PROGRAM

## 2025-08-12 PROCEDURE — 3077F SYST BP >= 140 MM HG: CPT | Performed by: STUDENT IN AN ORGANIZED HEALTH CARE EDUCATION/TRAINING PROGRAM

## 2025-08-12 PROCEDURE — 1126F AMNT PAIN NOTED NONE PRSNT: CPT | Performed by: STUDENT IN AN ORGANIZED HEALTH CARE EDUCATION/TRAINING PROGRAM

## 2025-08-12 PROCEDURE — 1123F ACP DISCUSS/DSCN MKR DOCD: CPT | Performed by: STUDENT IN AN ORGANIZED HEALTH CARE EDUCATION/TRAINING PROGRAM

## 2025-08-12 PROCEDURE — G8428 CUR MEDS NOT DOCUMENT: HCPCS | Performed by: STUDENT IN AN ORGANIZED HEALTH CARE EDUCATION/TRAINING PROGRAM

## 2025-08-12 PROCEDURE — 3017F COLORECTAL CA SCREEN DOC REV: CPT | Performed by: STUDENT IN AN ORGANIZED HEALTH CARE EDUCATION/TRAINING PROGRAM

## 2025-08-12 PROCEDURE — 1036F TOBACCO NON-USER: CPT | Performed by: STUDENT IN AN ORGANIZED HEALTH CARE EDUCATION/TRAINING PROGRAM

## 2025-08-12 PROCEDURE — 3079F DIAST BP 80-89 MM HG: CPT | Performed by: STUDENT IN AN ORGANIZED HEALTH CARE EDUCATION/TRAINING PROGRAM

## 2025-08-12 RX ORDER — ACETAMINOPHEN 325 MG/1
TABLET ORAL
COMMUNITY

## 2025-08-12 ASSESSMENT — ENCOUNTER SYMPTOMS
SHORTNESS OF BREATH: 0
ANAL BLEEDING: 0
COUGH: 1
VOMITING: 0
BACK PAIN: 0
CHEST TIGHTNESS: 0
RECTAL PAIN: 0
STRIDOR: 0
NAUSEA: 0
BLOOD IN STOOL: 0
ABDOMINAL PAIN: 1
CONSTIPATION: 1
WHEEZING: 0
COLOR CHANGE: 0
ABDOMINAL DISTENTION: 0
APNEA: 0
DIARRHEA: 1

## 2025-08-18 ENCOUNTER — TELEPHONE (OUTPATIENT)
Dept: SURGERY | Age: 70
End: 2025-08-18

## 2025-08-18 PROBLEM — K40.90 INGUINAL HERNIA: Status: ACTIVE | Noted: 2025-08-18

## 2025-08-19 ENCOUNTER — HOSPITAL ENCOUNTER (OUTPATIENT)
Dept: LAB | Facility: CLINIC | Age: 70
Discharge: HOME | End: 2025-08-19
Payer: MEDICARE

## 2025-08-19 RX ORDER — AMOXICILLIN 500 MG/1
CAPSULE ORAL
COMMUNITY
Start: 2025-06-10

## 2025-08-20 ENCOUNTER — TELEPHONE (OUTPATIENT)
Dept: SURGERY | Age: 70
End: 2025-08-20

## 2025-08-20 ENCOUNTER — ANESTHESIA EVENT (OUTPATIENT)
Dept: OPERATING ROOM | Age: 70
End: 2025-08-20
Payer: MEDICARE

## 2025-08-20 LAB
EKG ATRIAL RATE: 65 BPM
EKG P-R INTERVAL: 152 MS
EKG Q-T INTERVAL: 416 MS
EKG QRS DURATION: 82 MS
EKG QTC CALCULATION (BAZETT): 432 MS
EKG R AXIS: 147 DEGREES
EKG T AXIS: 133 DEGREES
EKG VENTRICULAR RATE: 65 BPM

## 2025-08-27 ENCOUNTER — OFFICE VISIT (OUTPATIENT)
Age: 70
End: 2025-08-27
Payer: MEDICARE

## 2025-08-27 VITALS
HEART RATE: 67 BPM | OXYGEN SATURATION: 97 % | HEIGHT: 69 IN | WEIGHT: 238 LBS | DIASTOLIC BLOOD PRESSURE: 74 MMHG | SYSTOLIC BLOOD PRESSURE: 156 MMHG | BODY MASS INDEX: 35.25 KG/M2

## 2025-08-27 DIAGNOSIS — Z01.818 PRE-OP EXAMINATION: Primary | ICD-10-CM

## 2025-08-27 PROCEDURE — 93000 ELECTROCARDIOGRAM COMPLETE: CPT | Performed by: INTERNAL MEDICINE

## 2025-08-27 PROCEDURE — 1123F ACP DISCUSS/DSCN MKR DOCD: CPT | Performed by: INTERNAL MEDICINE

## 2025-08-27 PROCEDURE — 99203 OFFICE O/P NEW LOW 30 MIN: CPT | Performed by: INTERNAL MEDICINE

## 2025-08-27 PROCEDURE — 3078F DIAST BP <80 MM HG: CPT | Performed by: INTERNAL MEDICINE

## 2025-08-27 PROCEDURE — 1159F MED LIST DOCD IN RCRD: CPT | Performed by: INTERNAL MEDICINE

## 2025-08-27 PROCEDURE — 3077F SYST BP >= 140 MM HG: CPT | Performed by: INTERNAL MEDICINE

## 2025-08-28 ENCOUNTER — HOSPITAL ENCOUNTER (OUTPATIENT)
Age: 70
Setting detail: OUTPATIENT SURGERY
Discharge: HOME | End: 2025-08-28
Attending: STUDENT IN AN ORGANIZED HEALTH CARE EDUCATION/TRAINING PROGRAM | Admitting: STUDENT IN AN ORGANIZED HEALTH CARE EDUCATION/TRAINING PROGRAM
Payer: MEDICARE

## 2025-08-28 ENCOUNTER — ANESTHESIA (OUTPATIENT)
Dept: OPERATING ROOM | Age: 70
End: 2025-08-28
Payer: MEDICARE

## 2025-08-28 VITALS
BODY MASS INDEX: 35.55 KG/M2 | SYSTOLIC BLOOD PRESSURE: 176 MMHG | HEIGHT: 69 IN | OXYGEN SATURATION: 95 % | HEART RATE: 60 BPM | RESPIRATION RATE: 22 BRPM | TEMPERATURE: 97.7 F | DIASTOLIC BLOOD PRESSURE: 73 MMHG | WEIGHT: 240 LBS

## 2025-08-28 DIAGNOSIS — G89.18 POST-OP PAIN: ICD-10-CM

## 2025-08-28 DIAGNOSIS — Z01.818 PRE-OP TESTING: Primary | ICD-10-CM

## 2025-08-28 PROCEDURE — C1781 MESH (IMPLANTABLE): HCPCS | Performed by: STUDENT IN AN ORGANIZED HEALTH CARE EDUCATION/TRAINING PROGRAM

## 2025-08-28 PROCEDURE — 6370000000 HC RX 637 (ALT 250 FOR IP)

## 2025-08-28 PROCEDURE — 49650 LAP ING HERNIA REPAIR INIT: CPT | Performed by: STUDENT IN AN ORGANIZED HEALTH CARE EDUCATION/TRAINING PROGRAM

## 2025-08-28 PROCEDURE — 3700000000 HC ANESTHESIA ATTENDED CARE: Performed by: STUDENT IN AN ORGANIZED HEALTH CARE EDUCATION/TRAINING PROGRAM

## 2025-08-28 PROCEDURE — 3600000019 HC SURGERY ROBOT ADDTL 15MIN: Performed by: STUDENT IN AN ORGANIZED HEALTH CARE EDUCATION/TRAINING PROGRAM

## 2025-08-28 PROCEDURE — 6360000002 HC RX W HCPCS

## 2025-08-28 PROCEDURE — 3700000001 HC ADD 15 MINUTES (ANESTHESIA): Performed by: STUDENT IN AN ORGANIZED HEALTH CARE EDUCATION/TRAINING PROGRAM

## 2025-08-28 PROCEDURE — 7100000000 HC PACU RECOVERY - FIRST 15 MIN: Performed by: STUDENT IN AN ORGANIZED HEALTH CARE EDUCATION/TRAINING PROGRAM

## 2025-08-28 PROCEDURE — S2900 ROBOTIC SURGICAL SYSTEM: HCPCS | Performed by: STUDENT IN AN ORGANIZED HEALTH CARE EDUCATION/TRAINING PROGRAM

## 2025-08-28 PROCEDURE — 7100000010 HC PHASE II RECOVERY - FIRST 15 MIN: Performed by: STUDENT IN AN ORGANIZED HEALTH CARE EDUCATION/TRAINING PROGRAM

## 2025-08-28 PROCEDURE — 2500000003 HC RX 250 WO HCPCS: Performed by: NURSE ANESTHETIST, CERTIFIED REGISTERED

## 2025-08-28 PROCEDURE — 3600000009 HC SURGERY ROBOT BASE: Performed by: STUDENT IN AN ORGANIZED HEALTH CARE EDUCATION/TRAINING PROGRAM

## 2025-08-28 PROCEDURE — 99222 1ST HOSP IP/OBS MODERATE 55: CPT | Performed by: STUDENT IN AN ORGANIZED HEALTH CARE EDUCATION/TRAINING PROGRAM

## 2025-08-28 PROCEDURE — 64488 TAP BLOCK BI INJECTION: CPT | Performed by: ANESTHESIOLOGY

## 2025-08-28 PROCEDURE — 7100000001 HC PACU RECOVERY - ADDTL 15 MIN: Performed by: STUDENT IN AN ORGANIZED HEALTH CARE EDUCATION/TRAINING PROGRAM

## 2025-08-28 PROCEDURE — 7100000011 HC PHASE II RECOVERY - ADDTL 15 MIN: Performed by: STUDENT IN AN ORGANIZED HEALTH CARE EDUCATION/TRAINING PROGRAM

## 2025-08-28 PROCEDURE — 6360000002 HC RX W HCPCS: Performed by: ANESTHESIOLOGY

## 2025-08-28 PROCEDURE — 2709999900 HC NON-CHARGEABLE SUPPLY: Performed by: STUDENT IN AN ORGANIZED HEALTH CARE EDUCATION/TRAINING PROGRAM

## 2025-08-28 PROCEDURE — 6360000002 HC RX W HCPCS: Performed by: NURSE ANESTHETIST, CERTIFIED REGISTERED

## 2025-08-28 PROCEDURE — 6360000002 HC RX W HCPCS: Performed by: STUDENT IN AN ORGANIZED HEALTH CARE EDUCATION/TRAINING PROGRAM

## 2025-08-28 PROCEDURE — 2580000003 HC RX 258: Performed by: ANESTHESIOLOGY

## 2025-08-28 DEVICE — MESH SURG W3.5XL6IN POLY SELF FIXATING RECT W/ RESRB PLA: Type: IMPLANTABLE DEVICE | Site: INGUINAL | Status: FUNCTIONAL

## 2025-08-28 RX ORDER — MIDAZOLAM HYDROCHLORIDE 1 MG/ML
INJECTION, SOLUTION INTRAMUSCULAR; INTRAVENOUS
Status: COMPLETED
Start: 2025-08-28 | End: 2025-08-28

## 2025-08-28 RX ORDER — ONDANSETRON 2 MG/ML
INJECTION INTRAMUSCULAR; INTRAVENOUS
Status: DISCONTINUED | OUTPATIENT
Start: 2025-08-28 | End: 2025-08-28 | Stop reason: SDUPTHER

## 2025-08-28 RX ORDER — METOCLOPRAMIDE HYDROCHLORIDE 5 MG/ML
10 INJECTION INTRAMUSCULAR; INTRAVENOUS
Status: DISCONTINUED | OUTPATIENT
Start: 2025-08-28 | End: 2025-08-28 | Stop reason: HOSPADM

## 2025-08-28 RX ORDER — ROCURONIUM BROMIDE 10 MG/ML
INJECTION, SOLUTION INTRAVENOUS
Status: DISCONTINUED | OUTPATIENT
Start: 2025-08-28 | End: 2025-08-28 | Stop reason: SDUPTHER

## 2025-08-28 RX ORDER — SODIUM CHLORIDE 9 MG/ML
INJECTION, SOLUTION INTRAVENOUS PRN
Status: DISCONTINUED | OUTPATIENT
Start: 2025-08-28 | End: 2025-08-28 | Stop reason: HOSPADM

## 2025-08-28 RX ORDER — FENTANYL CITRATE 50 UG/ML
INJECTION, SOLUTION INTRAMUSCULAR; INTRAVENOUS
Status: DISCONTINUED | OUTPATIENT
Start: 2025-08-28 | End: 2025-08-28 | Stop reason: SDUPTHER

## 2025-08-28 RX ORDER — HYDRALAZINE HYDROCHLORIDE 20 MG/ML
10 INJECTION INTRAMUSCULAR; INTRAVENOUS
Status: DISCONTINUED | OUTPATIENT
Start: 2025-08-28 | End: 2025-08-28 | Stop reason: HOSPADM

## 2025-08-28 RX ORDER — MIDAZOLAM HYDROCHLORIDE 1 MG/ML
INJECTION, SOLUTION INTRAMUSCULAR; INTRAVENOUS
Status: DISCONTINUED | OUTPATIENT
Start: 2025-08-28 | End: 2025-08-28 | Stop reason: SDUPTHER

## 2025-08-28 RX ORDER — OXYCODONE HYDROCHLORIDE 5 MG/1
10 TABLET ORAL PRN
Status: DISCONTINUED | OUTPATIENT
Start: 2025-08-28 | End: 2025-08-28 | Stop reason: HOSPADM

## 2025-08-28 RX ORDER — SODIUM CHLORIDE 0.9 % (FLUSH) 0.9 %
5-40 SYRINGE (ML) INJECTION EVERY 12 HOURS SCHEDULED
Status: DISCONTINUED | OUTPATIENT
Start: 2025-08-28 | End: 2025-08-28 | Stop reason: HOSPADM

## 2025-08-28 RX ORDER — ONDANSETRON 2 MG/ML
4 INJECTION INTRAMUSCULAR; INTRAVENOUS
Status: DISCONTINUED | OUTPATIENT
Start: 2025-08-28 | End: 2025-08-28 | Stop reason: HOSPADM

## 2025-08-28 RX ORDER — SODIUM CHLORIDE, SODIUM LACTATE, POTASSIUM CHLORIDE, CALCIUM CHLORIDE 600; 310; 30; 20 MG/100ML; MG/100ML; MG/100ML; MG/100ML
INJECTION, SOLUTION INTRAVENOUS CONTINUOUS
Status: DISCONTINUED | OUTPATIENT
Start: 2025-08-28 | End: 2025-08-28 | Stop reason: HOSPADM

## 2025-08-28 RX ORDER — MEPERIDINE HYDROCHLORIDE 50 MG/ML
12.5 INJECTION INTRAMUSCULAR; INTRAVENOUS; SUBCUTANEOUS ONCE
Status: DISCONTINUED | OUTPATIENT
Start: 2025-08-28 | End: 2025-08-28 | Stop reason: HOSPADM

## 2025-08-28 RX ORDER — METOPROLOL TARTRATE 1 MG/ML
INJECTION, SOLUTION INTRAVENOUS
Status: DISCONTINUED | OUTPATIENT
Start: 2025-08-28 | End: 2025-08-28 | Stop reason: SDUPTHER

## 2025-08-28 RX ORDER — OXYCODONE HYDROCHLORIDE 5 MG/1
5 TABLET ORAL PRN
Status: DISCONTINUED | OUTPATIENT
Start: 2025-08-28 | End: 2025-08-28 | Stop reason: HOSPADM

## 2025-08-28 RX ORDER — LIDOCAINE HYDROCHLORIDE 10 MG/ML
1 INJECTION, SOLUTION EPIDURAL; INFILTRATION; INTRACAUDAL; PERINEURAL
Status: DISCONTINUED | OUTPATIENT
Start: 2025-08-28 | End: 2025-08-28 | Stop reason: HOSPADM

## 2025-08-28 RX ORDER — DOCUSATE SODIUM 100 MG/1
100 CAPSULE, LIQUID FILLED ORAL 2 TIMES DAILY
Qty: 60 CAPSULE | Refills: 0 | Status: SHIPPED | OUTPATIENT
Start: 2025-08-28 | End: 2025-09-27

## 2025-08-28 RX ORDER — PHENYLEPHRINE HCL IN 0.9% NACL 1 MG/10 ML
SYRINGE (ML) INTRAVENOUS
Status: DISCONTINUED | OUTPATIENT
Start: 2025-08-28 | End: 2025-08-28 | Stop reason: SDUPTHER

## 2025-08-28 RX ORDER — OXYCODONE AND ACETAMINOPHEN 5; 325 MG/1; MG/1
1 TABLET ORAL EVERY 6 HOURS PRN
Qty: 20 TABLET | Refills: 0 | Status: SHIPPED | OUTPATIENT
Start: 2025-08-28 | End: 2025-09-02

## 2025-08-28 RX ORDER — LABETALOL HYDROCHLORIDE 5 MG/ML
10 INJECTION, SOLUTION INTRAVENOUS
Status: DISCONTINUED | OUTPATIENT
Start: 2025-08-28 | End: 2025-08-28 | Stop reason: HOSPADM

## 2025-08-28 RX ORDER — FENTANYL CITRATE 50 UG/ML
INJECTION, SOLUTION INTRAMUSCULAR; INTRAVENOUS
Status: COMPLETED
Start: 2025-08-28 | End: 2025-08-28

## 2025-08-28 RX ORDER — DIPHENHYDRAMINE HYDROCHLORIDE 50 MG/ML
12.5 INJECTION, SOLUTION INTRAMUSCULAR; INTRAVENOUS
Status: DISCONTINUED | OUTPATIENT
Start: 2025-08-28 | End: 2025-08-28 | Stop reason: HOSPADM

## 2025-08-28 RX ORDER — OXYCODONE AND ACETAMINOPHEN 5; 325 MG/1; MG/1
TABLET ORAL
Status: COMPLETED
Start: 2025-08-28 | End: 2025-08-28

## 2025-08-28 RX ORDER — DEXAMETHASONE SODIUM PHOSPHATE 4 MG/ML
INJECTION, SOLUTION INTRA-ARTICULAR; INTRALESIONAL; INTRAMUSCULAR; INTRAVENOUS; SOFT TISSUE
Status: DISCONTINUED | OUTPATIENT
Start: 2025-08-28 | End: 2025-08-28 | Stop reason: SDUPTHER

## 2025-08-28 RX ORDER — PROPOFOL 10 MG/ML
INJECTION, EMULSION INTRAVENOUS
Status: DISCONTINUED | OUTPATIENT
Start: 2025-08-28 | End: 2025-08-28 | Stop reason: SDUPTHER

## 2025-08-28 RX ORDER — MIDAZOLAM HYDROCHLORIDE 2 MG/2ML
2 INJECTION, SOLUTION INTRAMUSCULAR; INTRAVENOUS
Status: DISCONTINUED | OUTPATIENT
Start: 2025-08-28 | End: 2025-08-28 | Stop reason: HOSPADM

## 2025-08-28 RX ORDER — LIDOCAINE HYDROCHLORIDE 10 MG/ML
INJECTION, SOLUTION EPIDURAL; INFILTRATION; INTRACAUDAL; PERINEURAL
Status: DISCONTINUED | OUTPATIENT
Start: 2025-08-28 | End: 2025-08-28 | Stop reason: SDUPTHER

## 2025-08-28 RX ORDER — SODIUM CHLORIDE 0.9 % (FLUSH) 0.9 %
5-40 SYRINGE (ML) INJECTION PRN
Status: DISCONTINUED | OUTPATIENT
Start: 2025-08-28 | End: 2025-08-28 | Stop reason: HOSPADM

## 2025-08-28 RX ORDER — OXYCODONE AND ACETAMINOPHEN 5; 325 MG/1; MG/1
1 TABLET ORAL ONCE
Status: COMPLETED | OUTPATIENT
Start: 2025-08-28 | End: 2025-08-28

## 2025-08-28 RX ORDER — CYCLOBENZAPRINE HCL 5 MG
5 TABLET ORAL 3 TIMES DAILY PRN
Qty: 30 TABLET | Refills: 0 | Status: SHIPPED | OUTPATIENT
Start: 2025-08-28 | End: 2025-09-07

## 2025-08-28 RX ORDER — BUPIVACAINE HYDROCHLORIDE 2.5 MG/ML
INJECTION, SOLUTION EPIDURAL; INFILTRATION; INTRACAUDAL; PERINEURAL
Status: COMPLETED | OUTPATIENT
Start: 2025-08-28 | End: 2025-08-28

## 2025-08-28 RX ORDER — ONDANSETRON 4 MG/1
4 TABLET, FILM COATED ORAL DAILY PRN
Qty: 30 TABLET | Refills: 0 | Status: SHIPPED | OUTPATIENT
Start: 2025-08-28

## 2025-08-28 RX ADMIN — BUPIVACAINE HYDROCHLORIDE 50 ML: 2.5 INJECTION, SOLUTION EPIDURAL; INFILTRATION; INTRACAUDAL; PERINEURAL at 09:32

## 2025-08-28 RX ADMIN — METOPROLOL TARTRATE 2.5 MG: 5 INJECTION INTRAVENOUS at 11:37

## 2025-08-28 RX ADMIN — HYDROMORPHONE HYDROCHLORIDE 1 MG: 1 INJECTION, SOLUTION INTRAMUSCULAR; INTRAVENOUS; SUBCUTANEOUS at 12:57

## 2025-08-28 RX ADMIN — OXYCODONE HYDROCHLORIDE AND ACETAMINOPHEN 1 TABLET: 5; 325 TABLET ORAL at 14:18

## 2025-08-28 RX ADMIN — FENTANYL CITRATE 100 MCG: 50 INJECTION, SOLUTION INTRAMUSCULAR; INTRAVENOUS at 11:47

## 2025-08-28 RX ADMIN — SODIUM CHLORIDE, POTASSIUM CHLORIDE, SODIUM LACTATE AND CALCIUM CHLORIDE: 600; 310; 30; 20 INJECTION, SOLUTION INTRAVENOUS at 09:00

## 2025-08-28 RX ADMIN — PROPOFOL 200 MG: 10 INJECTION, EMULSION INTRAVENOUS at 10:04

## 2025-08-28 RX ADMIN — METOPROLOL TARTRATE 1 MG: 5 INJECTION INTRAVENOUS at 11:11

## 2025-08-28 RX ADMIN — MIDAZOLAM HYDROCHLORIDE 2 MG: 1 INJECTION, SOLUTION INTRAMUSCULAR; INTRAVENOUS at 09:29

## 2025-08-28 RX ADMIN — ROCURONIUM BROMIDE 70 MG: 10 INJECTION, SOLUTION INTRAVENOUS at 10:04

## 2025-08-28 RX ADMIN — METOPROLOL TARTRATE 2 MG: 5 INJECTION INTRAVENOUS at 11:20

## 2025-08-28 RX ADMIN — ROCURONIUM BROMIDE 25 MG: 10 INJECTION, SOLUTION INTRAVENOUS at 11:15

## 2025-08-28 RX ADMIN — FENTANYL CITRATE 100 MCG: 50 INJECTION INTRAMUSCULAR; INTRAVENOUS at 09:29

## 2025-08-28 RX ADMIN — Medication 200 MCG: at 10:15

## 2025-08-28 RX ADMIN — SUGAMMADEX 200 MG: 100 INJECTION, SOLUTION INTRAVENOUS at 12:57

## 2025-08-28 RX ADMIN — METOPROLOL TARTRATE 2 MG: 5 INJECTION INTRAVENOUS at 11:15

## 2025-08-28 RX ADMIN — OXYCODONE AND ACETAMINOPHEN 1 TABLET: 5; 325 TABLET ORAL at 14:18

## 2025-08-28 RX ADMIN — FENTANYL CITRATE 100 MCG: 50 INJECTION, SOLUTION INTRAMUSCULAR; INTRAVENOUS at 10:04

## 2025-08-28 RX ADMIN — LIDOCAINE HYDROCHLORIDE 50 MG: 10 INJECTION, SOLUTION EPIDURAL; INFILTRATION; INTRACAUDAL; PERINEURAL at 10:04

## 2025-08-28 RX ADMIN — Medication 50 MCG: at 10:29

## 2025-08-28 RX ADMIN — DEXAMETHASONE SODIUM PHOSPHATE 12 MG: 4 INJECTION INTRA-ARTICULAR; INTRALESIONAL; INTRAMUSCULAR; INTRAVENOUS; SOFT TISSUE at 10:22

## 2025-08-28 RX ADMIN — Medication 2000 MG: at 10:19

## 2025-08-28 RX ADMIN — ONDANSETRON 4 MG: 2 INJECTION, SOLUTION INTRAMUSCULAR; INTRAVENOUS at 12:55

## 2025-08-28 RX ADMIN — MIDAZOLAM 2 MG: 1 INJECTION INTRAMUSCULAR; INTRAVENOUS at 10:01

## 2025-08-28 ASSESSMENT — PAIN SCALES - GENERAL
PAINLEVEL_OUTOF10: 0
PAINLEVEL_OUTOF10: 4
PAINLEVEL_OUTOF10: 0
PAINLEVEL_OUTOF10: 0

## 2025-08-28 ASSESSMENT — PAIN DESCRIPTION - LOCATION: LOCATION: GROIN

## 2025-08-28 ASSESSMENT — PAIN - FUNCTIONAL ASSESSMENT
PAIN_FUNCTIONAL_ASSESSMENT: PREVENTS OR INTERFERES SOME ACTIVE ACTIVITIES AND ADLS
PAIN_FUNCTIONAL_ASSESSMENT: 0-10

## 2025-08-28 ASSESSMENT — PAIN DESCRIPTION - DESCRIPTORS: DESCRIPTORS: DULL

## 2025-08-28 ASSESSMENT — PAIN DESCRIPTION - PAIN TYPE: TYPE: SURGICAL PAIN

## 2025-08-28 ASSESSMENT — PAIN DESCRIPTION - ORIENTATION: ORIENTATION: RIGHT;LEFT

## (undated) DEVICE — BLADE CLIPPER GEN PURP NS

## (undated) DEVICE — COVER TIP DIA8MM DA VINCI SI XI X ENDOWRIST

## (undated) DEVICE — BASIN EMSIS 700ML GRAPHITE PLAS KID SHP GRAD

## (undated) DEVICE — ELECTRODE PT RET AD L9FT HI MOIST COND ADH HYDRGEL CORDED

## (undated) DEVICE — GARMENT,MEDLINE,DVT,INT,CALF,MED, GEN2: Brand: MEDLINE

## (undated) DEVICE — 3M™ STERI-STRIP™ COMPOUND BENZOIN TINCTURE 40 BAGS/CARTON 4 CARTONS/CASE C1544: Brand: 3M™ STERI-STRIP™

## (undated) DEVICE — STRIP,CLOSURE,WOUND,MEDI-STRIP,1/2X4: Brand: MEDLINE

## (undated) DEVICE — GUIDEWIRE URO L150CM DIA0.035IN STIFF NIT HYDRPHLC STR TIP

## (undated) DEVICE — SUTURE VICRYL + SZ 3-0 L27IN ABSRB UD L26MM SH 1/2 CIR VCP416H

## (undated) DEVICE — ADHESIVE SKIN CLOSURE TOP 0.8 CC PREM PUR LIQUIBAND RAPID LF

## (undated) DEVICE — NEEDLE INSUF L150MM OD13GA DISP VERES

## (undated) DEVICE — BAG SPECIMEN BIOHAZARD 6IN X 9IN

## (undated) DEVICE — DUAL LUMEN URETERAL CATHETER

## (undated) DEVICE — SUTURE DEV SZ 2-0 WND CLSR ABSRB GS-22 VLOC COVIDIEN VLOCM2145

## (undated) DEVICE — GLOVE ORANGE PI 7   MSG9070

## (undated) DEVICE — SURGICAL SCRUB TRAY PREM DRY SKIN VLY LF

## (undated) DEVICE — SUTURE MONOCRYL + SZ 4-0 L27IN ABSRB UD L19MM PS-2 3/8 CIR MCP426H

## (undated) DEVICE — SYRINGE MED 10ML LUERLOCK TIP W/O SFTY DISP

## (undated) DEVICE — DRAPE,REIN 53X77,STERILE: Brand: MEDLINE

## (undated) DEVICE — SEAL UNIVERSAL 5-12MM

## (undated) DEVICE — SOLUTION SCRB 4OZ 4% CHG H2O AIDED FOR PREOPERATIVE SKIN

## (undated) DEVICE — BLANKET WARMING L 2010 X W 760 MM UPPER BODY 2 HOSE INLET

## (undated) DEVICE — GLOVE SURG SZ 65 THK91MIL LTX FREE SYN POLYISOPRENE

## (undated) DEVICE — ADAPTER URO SCP UROLOK LL

## (undated) DEVICE — GARMENT COMPR STD FOR 17IN CALF UNIF THER FLOTRN

## (undated) DEVICE — SOLUTION IRRIG 1000ML 09% SOD CHL USP PIC PLAS CONTAINER

## (undated) DEVICE — STRAP POS FOAM SFT STR FOR KNEE BODY

## (undated) DEVICE — MEDICINE CUP, GRADUATED, STER: Brand: MEDLINE

## (undated) DEVICE — 9165 UNIVERSAL PATIENT PLATE: Brand: 3M™

## (undated) DEVICE — GOWN SURG XL L47IN BLU NONREINFORCED HK AND LOOP AAMI LEV 3

## (undated) DEVICE — OBTURATOR ROBOTIC DIA8MM STD OPT BLDELSS

## (undated) DEVICE — TRAP SURG QUAD PARABOLA SLOT DSGN SFTY SCRN TRAPEASE

## (undated) DEVICE — SERVICE TREATMENT ESWL UNILAT LITHO

## (undated) DEVICE — SYSTEM POS SZ 36 X 20 X 1 IN INTEGR ADJ ARM PROTECTOR LIFT

## (undated) DEVICE — Device

## (undated) DEVICE — GLOVE ORANGE PI 7 1/2   MSG9075

## (undated) DEVICE — GAUZE,SPONGE,4"X4",16PLY,STRL,LF,10/TRAY: Brand: MEDLINE

## (undated) DEVICE — FIBER LASER HOLM DISP SU200RT] LEONI FIBER OPTICS INC]

## (undated) DEVICE — SINGLE ACTION PUMPING SYSTEM

## (undated) DEVICE — TRAY URETH CATH 16FR 10CC BLLN SILIC1 CLS SYS 2500ML DRN BG

## (undated) DEVICE — FORCEPS BX L240CM WRK CHN 2.8MM STD CAP W/ NDL MIC MESH

## (undated) DEVICE — DRAPE ARM W21XH19XL10.5IN DA VINCI XI INTUITIVE SURGICAL

## (undated) DEVICE — JELLY,LUBE,STERILE,FLIP TOP,TUBE,2-OZ: Brand: MEDLINE